# Patient Record
Sex: MALE | Race: OTHER | NOT HISPANIC OR LATINO | URBAN - METROPOLITAN AREA
[De-identification: names, ages, dates, MRNs, and addresses within clinical notes are randomized per-mention and may not be internally consistent; named-entity substitution may affect disease eponyms.]

---

## 2022-12-25 ENCOUNTER — EMERGENCY (EMERGENCY)
Facility: HOSPITAL | Age: 37
LOS: 0 days | Discharge: HOME | End: 2022-12-25
Attending: EMERGENCY MEDICINE | Admitting: EMERGENCY MEDICINE
Payer: MEDICAID

## 2022-12-25 VITALS
SYSTOLIC BLOOD PRESSURE: 139 MMHG | RESPIRATION RATE: 18 BRPM | OXYGEN SATURATION: 100 % | HEART RATE: 88 BPM | TEMPERATURE: 98 F | DIASTOLIC BLOOD PRESSURE: 79 MMHG

## 2022-12-25 DIAGNOSIS — K76.0 FATTY (CHANGE OF) LIVER, NOT ELSEWHERE CLASSIFIED: ICD-10-CM

## 2022-12-25 DIAGNOSIS — N50.89 OTHER SPECIFIED DISORDERS OF THE MALE GENITAL ORGANS: ICD-10-CM

## 2022-12-25 DIAGNOSIS — F17.200 NICOTINE DEPENDENCE, UNSPECIFIED, UNCOMPLICATED: ICD-10-CM

## 2022-12-25 DIAGNOSIS — K82.8 OTHER SPECIFIED DISEASES OF GALLBLADDER: ICD-10-CM

## 2022-12-25 DIAGNOSIS — N45.3 EPIDIDYMO-ORCHITIS: ICD-10-CM

## 2022-12-25 DIAGNOSIS — D72.829 ELEVATED WHITE BLOOD CELL COUNT, UNSPECIFIED: ICD-10-CM

## 2022-12-25 LAB
ALBUMIN SERPL ELPH-MCNC: 4.5 G/DL — SIGNIFICANT CHANGE UP (ref 3.5–5.2)
ALP SERPL-CCNC: 54 U/L — SIGNIFICANT CHANGE UP (ref 30–115)
ALT FLD-CCNC: 19 U/L — SIGNIFICANT CHANGE UP (ref 0–41)
ANION GAP SERPL CALC-SCNC: 16 MMOL/L — HIGH (ref 7–14)
APPEARANCE UR: ABNORMAL
AST SERPL-CCNC: 14 U/L — SIGNIFICANT CHANGE UP (ref 0–41)
BACTERIA # UR AUTO: NEGATIVE — SIGNIFICANT CHANGE UP
BASOPHILS # BLD AUTO: 0.04 K/UL — SIGNIFICANT CHANGE UP (ref 0–0.2)
BASOPHILS NFR BLD AUTO: 0.2 % — SIGNIFICANT CHANGE UP (ref 0–1)
BILIRUB SERPL-MCNC: 1.1 MG/DL — SIGNIFICANT CHANGE UP (ref 0.2–1.2)
BILIRUB UR-MCNC: NEGATIVE — SIGNIFICANT CHANGE UP
BUN SERPL-MCNC: 12 MG/DL — SIGNIFICANT CHANGE UP (ref 10–20)
CALCIUM SERPL-MCNC: 9.1 MG/DL — SIGNIFICANT CHANGE UP (ref 8.4–10.5)
CHLORIDE SERPL-SCNC: 97 MMOL/L — LOW (ref 98–110)
CO2 SERPL-SCNC: 23 MMOL/L — SIGNIFICANT CHANGE UP (ref 17–32)
COLOR SPEC: YELLOW — SIGNIFICANT CHANGE UP
CREAT SERPL-MCNC: 0.6 MG/DL — LOW (ref 0.7–1.5)
DIFF PNL FLD: ABNORMAL
EGFR: 128 ML/MIN/1.73M2 — SIGNIFICANT CHANGE UP
EOSINOPHIL # BLD AUTO: 0 K/UL — SIGNIFICANT CHANGE UP (ref 0–0.7)
EOSINOPHIL NFR BLD AUTO: 0 % — SIGNIFICANT CHANGE UP (ref 0–8)
EPI CELLS # UR: 0 /HPF — SIGNIFICANT CHANGE UP (ref 0–5)
GLUCOSE SERPL-MCNC: 138 MG/DL — HIGH (ref 70–99)
GLUCOSE UR QL: SIGNIFICANT CHANGE UP
HCT VFR BLD CALC: 38.1 % — LOW (ref 42–52)
HGB BLD-MCNC: 13.6 G/DL — LOW (ref 14–18)
HYALINE CASTS # UR AUTO: 0 /LPF — SIGNIFICANT CHANGE UP (ref 0–7)
IMM GRANULOCYTES NFR BLD AUTO: 0.5 % — HIGH (ref 0.1–0.3)
KETONES UR-MCNC: ABNORMAL
LEUKOCYTE ESTERASE UR-ACNC: ABNORMAL
LYMPHOCYTES # BLD AUTO: 0.7 K/UL — LOW (ref 1.2–3.4)
LYMPHOCYTES # BLD AUTO: 3.2 % — LOW (ref 20.5–51.1)
MCHC RBC-ENTMCNC: 32.2 PG — HIGH (ref 27–31)
MCHC RBC-ENTMCNC: 35.7 G/DL — SIGNIFICANT CHANGE UP (ref 32–37)
MCV RBC AUTO: 90.1 FL — SIGNIFICANT CHANGE UP (ref 80–94)
MONOCYTES # BLD AUTO: 1.19 K/UL — HIGH (ref 0.1–0.6)
MONOCYTES NFR BLD AUTO: 5.4 % — SIGNIFICANT CHANGE UP (ref 1.7–9.3)
NEUTROPHILS # BLD AUTO: 19.96 K/UL — HIGH (ref 1.4–6.5)
NEUTROPHILS NFR BLD AUTO: 90.7 % — HIGH (ref 42.2–75.2)
NITRITE UR-MCNC: NEGATIVE — SIGNIFICANT CHANGE UP
NRBC # BLD: 0 /100 WBCS — SIGNIFICANT CHANGE UP (ref 0–0)
PH UR: 6 — SIGNIFICANT CHANGE UP (ref 5–8)
PLATELET # BLD AUTO: 362 K/UL — SIGNIFICANT CHANGE UP (ref 130–400)
POTASSIUM SERPL-MCNC: 3.5 MMOL/L — SIGNIFICANT CHANGE UP (ref 3.5–5)
POTASSIUM SERPL-SCNC: 3.5 MMOL/L — SIGNIFICANT CHANGE UP (ref 3.5–5)
PROT SERPL-MCNC: 6.7 G/DL — SIGNIFICANT CHANGE UP (ref 6–8)
PROT UR-MCNC: ABNORMAL
RBC # BLD: 4.23 M/UL — LOW (ref 4.7–6.1)
RBC # FLD: 12.9 % — SIGNIFICANT CHANGE UP (ref 11.5–14.5)
RBC CASTS # UR COMP ASSIST: 7 /HPF — HIGH (ref 0–4)
SODIUM SERPL-SCNC: 136 MMOL/L — SIGNIFICANT CHANGE UP (ref 135–146)
SP GR SPEC: 1.03 — HIGH (ref 1.01–1.03)
UROBILINOGEN FLD QL: SIGNIFICANT CHANGE UP
WBC # BLD: 22 K/UL — HIGH (ref 4.8–10.8)
WBC # FLD AUTO: 22 K/UL — HIGH (ref 4.8–10.8)
WBC UR QL: 563 /HPF — HIGH (ref 0–5)

## 2022-12-25 PROCEDURE — 99285 EMERGENCY DEPT VISIT HI MDM: CPT

## 2022-12-25 PROCEDURE — 76870 US EXAM SCROTUM: CPT | Mod: 26

## 2022-12-25 PROCEDURE — 74177 CT ABD & PELVIS W/CONTRAST: CPT | Mod: 26,MA

## 2022-12-25 RX ORDER — KETOROLAC TROMETHAMINE 30 MG/ML
60 SYRINGE (ML) INJECTION ONCE
Refills: 0 | Status: DISCONTINUED | OUTPATIENT
Start: 2022-12-25 | End: 2022-12-26

## 2022-12-25 RX ORDER — SODIUM CHLORIDE 9 MG/ML
1000 INJECTION, SOLUTION INTRAVENOUS ONCE
Refills: 0 | Status: COMPLETED | OUTPATIENT
Start: 2022-12-25 | End: 2022-12-25

## 2022-12-25 RX ORDER — ONDANSETRON 8 MG/1
4 TABLET, FILM COATED ORAL ONCE
Refills: 0 | Status: COMPLETED | OUTPATIENT
Start: 2022-12-25 | End: 2022-12-25

## 2022-12-25 RX ORDER — CEFTRIAXONE 500 MG/1
500 INJECTION, POWDER, FOR SOLUTION INTRAMUSCULAR; INTRAVENOUS ONCE
Refills: 0 | Status: COMPLETED | OUTPATIENT
Start: 2022-12-25 | End: 2022-12-25

## 2022-12-25 RX ORDER — MORPHINE SULFATE 50 MG/1
4 CAPSULE, EXTENDED RELEASE ORAL ONCE
Refills: 0 | Status: DISCONTINUED | OUTPATIENT
Start: 2022-12-25 | End: 2022-12-25

## 2022-12-25 RX ADMIN — MORPHINE SULFATE 4 MILLIGRAM(S): 50 CAPSULE, EXTENDED RELEASE ORAL at 19:30

## 2022-12-25 RX ADMIN — SODIUM CHLORIDE 1000 MILLILITER(S): 9 INJECTION, SOLUTION INTRAVENOUS at 19:14

## 2022-12-25 RX ADMIN — CEFTRIAXONE 500 MILLIGRAM(S): 500 INJECTION, POWDER, FOR SOLUTION INTRAMUSCULAR; INTRAVENOUS at 20:00

## 2022-12-25 RX ADMIN — MORPHINE SULFATE 4 MILLIGRAM(S): 50 CAPSULE, EXTENDED RELEASE ORAL at 19:14

## 2022-12-25 RX ADMIN — ONDANSETRON 4 MILLIGRAM(S): 8 TABLET, FILM COATED ORAL at 19:15

## 2022-12-25 NOTE — ED PROVIDER NOTE - ATTENDING APP SHARED VISIT CONTRIBUTION OF CARE
37 year old male, no pmhx presenting with b/l testicular pain and swelling that has been worsening x 1 week. Pain is described as burning, non-radiating, no palliative or provocative factors, moderate severity. Denies having this before. Otherwise denies fevers, penile discharge, abd pain, N/V/D, blood in stool, urinary symptoms or any other complaints. Denies sexual activity.    Vital Signs: I have reviewed the initial vital signs.  Constitutional: NAD, well-nourished, appears stated age, no acute distress.  HEENT: Airway patent, moist MM, no erythema/swelling/deformity of oral structures. EOMI, PERRLA.  CV: regular rate, regular rhythm, well-perfused extremities, 2+ b/l DP and radial pulses equal.  Lungs: BCTA, no increased WOB.  ABD: (+) lower abdominal tenderness, no guarding or rebound, no pulsatile mass, no hernias.   : (+) tenderness to b/l testicles with mild swelling but b/l cremasteric reflexes intact, normal testicular lie, no penile lesions  MSK: Neck supple, nontender, nl ROM, no stepoff. Chest nontender. Back nontender in TLS spine or to b/l bony structures or flanks. Ext nontender, nl rom, no deformity.   INTEG: Skin warm, dry, no rash.  NEURO: A&Ox3, normal strength, nl sensation throughout, normal speech.   PSYCH: Calm, cooperative, normal affect and interaction.    Will obtain labs, testicular US, consider CT abd/pelvis for tenderness, symptomatic control PRN, re-eval.

## 2022-12-25 NOTE — ED PROVIDER NOTE - CARE PROVIDER_API CALL
Ranjith Mcconnell)  Urology  [address] [place] [address], [address]  [place] [address]  Phone: [phone]  Fax: [phone]  Follow Up Time:

## 2022-12-25 NOTE — ED PROVIDER NOTE - OBJECTIVE STATEMENT
37 M no hx presents to ED for testicular pain and swelling. sts that he has had the pain for about a week but it has now become constant with radiation to his lower abdomen. denies sexual activity with multiple partners, N,V,fver,Chills, penile discharge

## 2022-12-25 NOTE — ED PROVIDER NOTE - NS ED ATTENDING STATEMENT MOD
This was a shared visit with the MARIA ALEJANDRA. I reviewed and verified the documentation and independently performed the documented:

## 2022-12-25 NOTE — ED PROVIDER NOTE - NS ED ROS FT
Constitutional: (-) fever, (-) chills  Eyes: (-) visual changes  ENT: (-) nasal congestions  Cardiovascular: (-) chest pain, (-) syncope  Respiratory: (-) cough, (-) shortness of breath, (-) dyspnea,   Gastrointestinal: (-) vomiting, (-) diarrhea, (-)nausea,  Musculoskeletal: (-) neck pain, (-) back pain, (-) joint pain,  Integumentary: (-) rash, (-) edema, (-) bruises  Neurological: (-) headache, (-) loc, (-) dizziness, (-) tingling, (-)numbness,  Psychiatric: (-) hallucinations, (-)nervousness, (-)depression, (-)SI/HI  Peripheral Vascular: (-) leg swelling  :  (-)dysuria,  (+) hematuria (+) scrotal pain   Allergic/Immunologic: (-) pruritus

## 2022-12-25 NOTE — ED PROVIDER NOTE - PHYSICAL EXAMINATION
No
VITAL SIGNS: I have reviewed nursing notes and confirm.  CONSTITUTIONAL:  in no acute distress.  SKIN: Skin exam is warm and dry, no acute rash.  HEAD: Normocephalic; atraumatic.  EYES:  EOM intact; conjunctiva and sclera clear.  ENT: No nasal discharge; airway clear.   NECK: Supple; non tender.  CARD: S1, S2 normal; no murmurs, gallops, or rubs. Regular rate and rhythm.  RESP: No wheezes, rales or rhonchi. Speaking in full sentences.   ABD: Normal bowel sounds; soft; non-distended; tender to lower abdomen; No rebound or guarding. No CVA tenderness.  : right side scrotum erythematous and swollen, ttp scrotum, + phren sign, + cremasteric reflex   EXT: Normal ROM. No clubbing, cyanosis or edema.  NEURO: Alert, oriented. Grossly unremarkable. No focal deficits.

## 2022-12-25 NOTE — ED PROVIDER NOTE - NSFOLLOWUPINSTRUCTIONS_ED_ALL_ED_FT
Please follow up with urology in the next 1-3 days     *****Our Emergency Department Referral Coordinators will be reaching out to you in the next 24-48 hours from 9:00am to 5:00pm with a follow up appointment. Please expect a phone call from the hospital in that time frame. If you do not receive a call or if you have any questions or concerns, you can reach them at [phone] or [phone].     WHAT YOU NEED TO KNOW:    What is epididymo-orchitis? Epididymo-orchitis is inflammation of your epididymis and testicle. The epididymis is a coiled tube inside your scrotum. It stores and carries sperm from your testicles to your penis. Epididymo-orchitis usually affects the epididymis and testicle on one side, but it may affect both sides.     What causes epididymo-orchitis?   •A urinary tract infection (UTI) or mumps virus infection that spreads to the epididymis   •Trauma or injury of the testes   •Urine that flows backward from your urethra to the epididymis  •Sexually transmitted infections (STIs) such as gonorrhea or Chlamydia  •Use of heart medicine called amiodarone    What are the signs and symptoms of epididymo-orchitis?   •Pain or tenderness in your scrotum, abdomen, or groin  •Redness or swelling of your scrotum  •Pain or burning during urination, or frequent urination  •Discharge from your penis or blood in your urine or semen  •Fever    How is epididymo-orchitis diagnosed? Your healthcare provider may examine your penis, prostate, and scrotum. He may ask about your symptoms and any health conditions you have. You may need any of the following tests:   •Blood and urine tests may be done to check for infection. If you have discharge, a small amount of this fluid will be tested for bacteria.   •An ultrasound uses sound waves to show pictures of your testicles on a monitor. An ultrasound may be used to check blood flow to your testicles.   •A nuclear scan checks the blood flow in your testicles. A small amount of radioactive material may be injected into your blood. The radioactive material helps your blood vessels show up better.    How is epididymo-orchitis treated? Treatment depends on the cause of your epididymo-orchitis and may include any of the following:   •Antibiotics help treat a bacterial infection.   •NSAIDs, such as ibuprofen, help decrease swelling, pain, and fever. This medicine is available with or without a doctor's order. NSAIDs can cause stomach bleeding or kidney problems in certain people. If you take blood thinner medicine, always ask if NSAIDs are safe for you. Always read the medicine label and follow directions. Do not give these medicines to children under 6 months of age without direction from your child's healthcare provider.  •Acetaminophen decreases pain and fever. It is available without a doctor's order. Ask how much to take and how often to take it. Follow directions. Acetaminophen can cause liver damage if not taken correctly.  •Prescription pain medicine may be given. Ask how to take this medicine safely.  •Surgery may be needed if your condition gets worse. Surgery to drain an abscess (collection of pus) may be needed. Surgery to remove part or all of your epididymis or testicle may also be done.     How can I manage epididymo-orchitis?   •Apply ice on your testicles for 15 to 20 minutes every hour or as directed. Use an ice pack, or put crushed ice in a plastic bag. Cover it with a towel. Ice helps prevent tissue damage and decreases swelling and pain.  •Rest in bed as directed. Elevate your scrotum when you sit or lie down to help reduce swelling and pain. You may be asked to do this by placing a rolled-up towel under your scrotum.  •Scrotal support may be recommended. An athletic supporter provides scrotal support and may make you more comfortable when you stand. Ask your healthcare provider how to use an athletic supporter.   •Do not lift heavy objects. You can make swelling worse if you lift heavy objects or strain.     When should I seek immediate care?   •You have severe pain in your testicles.  •Your symptoms become worse even after you start treatment with medicine.    When should I contact my healthcare provider?   •Your symptoms do not get better within 3 days of treatment or come back after treatment.  •You have a hot, red, tender area on your testicles.  •You have questions or concerns about your condition or care.    CARE AGREEMENT:    You have the right to help plan your care. Learn about your health condition and how it may be treated. Discuss treatment options with your healthcare providers to decide what care you want to receive. You always have the right to refuse treatment.

## 2022-12-25 NOTE — ED PROVIDER NOTE - PATIENT PORTAL LINK FT
You can access the FollowMyHealth Patient Portal offered by [facility] by registering at the following website: [email or web address]. By joining [employer]’s FollowMyHealth portal, you will also be able to view your health information using other applications (apps) compatible with our system.

## 2022-12-25 NOTE — ED PROVIDER NOTE - IV ALTEPLASE EXCL REL HIDDEN
Spoke to mom. Notified that the doctor still needs to review results however this RN has reviewed them and they appear stable. Notified that if Dr. Rodriguez has any concerns, we will call her back   show

## 2022-12-25 NOTE — ED PROVIDER NOTE - CLINICAL SUMMARY MEDICAL DECISION MAKING FREE TEXT BOX
Patient presented with testicular pain and swelling x 1 week. Otherwise afebrile, HD stable, abd non-tender. No CVA tenderness. Labs obtained and showed (+) leukocytosis to 22k but no other abnormalities. UA showed (+) blood but no evidence of infection. CT abd/pelvis negative for emergent pathologies. Testicular US showed (+) signs of epididymoorchitis which is likely etiology of patient's pain. No abscess. Patient felt better after tx in ED. Ambulatory, tolerates PO. Given the above, will discharge home with PO abx and outpatient follow up. Patient agreeable with plan. Agrees to return to ED for any new or worsening symptoms.

## 2022-12-26 ENCOUNTER — INPATIENT (INPATIENT)
Facility: HOSPITAL | Age: 37
LOS: 2 days | Discharge: HOME | End: 2022-12-29
Attending: STUDENT IN AN ORGANIZED HEALTH CARE EDUCATION/TRAINING PROGRAM | Admitting: STUDENT IN AN ORGANIZED HEALTH CARE EDUCATION/TRAINING PROGRAM
Payer: MEDICAID

## 2022-12-26 VITALS
DIASTOLIC BLOOD PRESSURE: 70 MMHG | HEART RATE: 94 BPM | SYSTOLIC BLOOD PRESSURE: 130 MMHG | RESPIRATION RATE: 17 BRPM | WEIGHT: 149.91 LBS | TEMPERATURE: 100 F | OXYGEN SATURATION: 98 %

## 2022-12-26 LAB
HCV AB S/CO SERPL IA: 0.05 COI — SIGNIFICANT CHANGE UP
HCV AB SERPL-IMP: SIGNIFICANT CHANGE UP
SARS-COV-2 RNA SPEC QL NAA+PROBE: SIGNIFICANT CHANGE UP

## 2022-12-26 PROCEDURE — 99221 1ST HOSP IP/OBS SF/LOW 40: CPT

## 2022-12-26 PROCEDURE — 99285 EMERGENCY DEPT VISIT HI MDM: CPT

## 2022-12-26 RX ORDER — CEFTRIAXONE 500 MG/1
2000 INJECTION, POWDER, FOR SOLUTION INTRAMUSCULAR; INTRAVENOUS EVERY 24 HOURS
Refills: 0 | Status: DISCONTINUED | OUTPATIENT
Start: 2022-12-26 | End: 2022-12-26

## 2022-12-26 RX ORDER — MORPHINE SULFATE 50 MG/1
2 CAPSULE, EXTENDED RELEASE ORAL ONCE
Refills: 0 | Status: DISCONTINUED | OUTPATIENT
Start: 2022-12-26 | End: 2022-12-26

## 2022-12-26 RX ORDER — ONDANSETRON 8 MG/1
4 TABLET, FILM COATED ORAL EVERY 8 HOURS
Refills: 0 | Status: DISCONTINUED | OUTPATIENT
Start: 2022-12-26 | End: 2022-12-29

## 2022-12-26 RX ORDER — CEFTRIAXONE 500 MG/1
1000 INJECTION, POWDER, FOR SOLUTION INTRAMUSCULAR; INTRAVENOUS EVERY 24 HOURS
Refills: 0 | Status: DISCONTINUED | OUTPATIENT
Start: 2022-12-26 | End: 2022-12-27

## 2022-12-26 RX ORDER — MORPHINE SULFATE 50 MG/1
4 CAPSULE, EXTENDED RELEASE ORAL ONCE
Refills: 0 | Status: DISCONTINUED | OUTPATIENT
Start: 2022-12-26 | End: 2022-12-26

## 2022-12-26 RX ORDER — INFLUENZA VIRUS VACCINE 15; 15; 15; 15 UG/.5ML; UG/.5ML; UG/.5ML; UG/.5ML
0.5 SUSPENSION INTRAMUSCULAR ONCE
Refills: 0 | Status: DISCONTINUED | OUTPATIENT
Start: 2022-12-26 | End: 2022-12-29

## 2022-12-26 RX ORDER — CEFTRIAXONE 500 MG/1
1000 INJECTION, POWDER, FOR SOLUTION INTRAMUSCULAR; INTRAVENOUS ONCE
Refills: 0 | Status: DISCONTINUED | OUTPATIENT
Start: 2022-12-26 | End: 2022-12-26

## 2022-12-26 RX ORDER — KETOROLAC TROMETHAMINE 30 MG/ML
15 SYRINGE (ML) INJECTION EVERY 12 HOURS
Refills: 0 | Status: DISCONTINUED | OUTPATIENT
Start: 2022-12-26 | End: 2022-12-28

## 2022-12-26 RX ORDER — ACETAMINOPHEN 500 MG
650 TABLET ORAL EVERY 6 HOURS
Refills: 0 | Status: DISCONTINUED | OUTPATIENT
Start: 2022-12-26 | End: 2022-12-29

## 2022-12-26 RX ORDER — SODIUM CHLORIDE 9 MG/ML
1000 INJECTION, SOLUTION INTRAVENOUS ONCE
Refills: 0 | Status: COMPLETED | OUTPATIENT
Start: 2022-12-26 | End: 2022-12-26

## 2022-12-26 RX ORDER — TRAMADOL HYDROCHLORIDE 50 MG/1
25 TABLET ORAL EVERY 8 HOURS
Refills: 0 | Status: DISCONTINUED | OUTPATIENT
Start: 2022-12-26 | End: 2022-12-28

## 2022-12-26 RX ADMIN — MORPHINE SULFATE 2 MILLIGRAM(S): 50 CAPSULE, EXTENDED RELEASE ORAL at 09:36

## 2022-12-26 RX ADMIN — SODIUM CHLORIDE 1000 MILLILITER(S): 9 INJECTION, SOLUTION INTRAVENOUS at 02:00

## 2022-12-26 RX ADMIN — TRAMADOL HYDROCHLORIDE 25 MILLIGRAM(S): 50 TABLET ORAL at 21:17

## 2022-12-26 RX ADMIN — MORPHINE SULFATE 2 MILLIGRAM(S): 50 CAPSULE, EXTENDED RELEASE ORAL at 22:18

## 2022-12-26 RX ADMIN — Medication 100 MILLIGRAM(S): at 17:23

## 2022-12-26 RX ADMIN — MORPHINE SULFATE 2 MILLIGRAM(S): 50 CAPSULE, EXTENDED RELEASE ORAL at 22:48

## 2022-12-26 RX ADMIN — Medication 100 MILLIGRAM(S): at 11:44

## 2022-12-26 RX ADMIN — MORPHINE SULFATE 4 MILLIGRAM(S): 50 CAPSULE, EXTENDED RELEASE ORAL at 03:00

## 2022-12-26 RX ADMIN — Medication 15 MILLIGRAM(S): at 14:26

## 2022-12-26 RX ADMIN — CEFTRIAXONE 100 MILLIGRAM(S): 500 INJECTION, POWDER, FOR SOLUTION INTRAMUSCULAR; INTRAVENOUS at 11:46

## 2022-12-26 RX ADMIN — TRAMADOL HYDROCHLORIDE 25 MILLIGRAM(S): 50 TABLET ORAL at 21:47

## 2022-12-26 NOTE — H&P ADULT - HISTORY OF PRESENT ILLNESS
37 y m, no pmh, pw testicular pain. Pt was just evaluated in the ed hours ago and found to have epididymitis. Pt given ceftriaxone and told to follow up with urologist. However, pt endorses testicular pain is still 10/10, constant, radiating to the lower belly, no relieving factors, requesting admission.    ED vitals:   /70, HR 94, RR 17, Temp 99.9, O2 99% on RA    Sig labs:   WBC 22K otherwise unremarkable    UA: +ve for LE    Ct abd pelvis with IV cont:  No hydronephrosis or nephrolithiasis. Evaluation for   intrarenal stones limited by the presence of intravenous contrast.  Large liver with mild fatty infiltration. Distended gallbladder with no   radiopaque gallstones identified.    US testicles:   Right testicular and epididymis hyperemia consistent with   epididymoorchitis in appropriate clinical setting. No drainable fluid   collection.    Pt was given ceftriaxone and levaquin in the ED, Pt was supposed to be sent home from the ED but said pain was unbearable so he was admitted     37 y Male with PMHx of percocet abuse presented to the ED for testicular pain. Pt was just evaluated in the ed hours before current presentation and found to have epididymitis. Pt given ceftriaxone and told to follow up with urologist. However, pt returned back endorses testicular pain is still 10/10, constant, radiating to the lower belly, no relieving factors, requesting admission.    ED vitals:   /70, HR 94, RR 17, Temp 99.9, O2 99% on RA    Sig labs:   WBC 22K otherwise unremarkable    UA: +ve for LE    Ct abd pelvis with IV cont:  No hydronephrosis or nephrolithiasis. Evaluation for   intrarenal stones limited by the presence of intravenous contrast.  Large liver with mild fatty infiltration. Distended gallbladder with no   radiopaque gallstones identified.    US testicles:   Right testicular and epididymis hyperemia consistent with   epididymoorchitis in appropriate clinical setting. No drainable fluid   collection.    Pt was given ceftriaxone and levaquin in the ED, Pt was supposed to be sent home from the ED but said pain was unbearable so he was admitted     37 y Male with PMHx of percocet abuse presented to the ED for testicular pain. Pt was just evaluated in the ed hours before current presentation and found to have epididymitis. Pain started Debra georgette with testicular swelling. Pt given ceftriaxone and told to follow up with urologist. However, pt returned back endorses testicular pain is still 10/10, constant, radiating to the lower belly.     Pt has had increased frequency of urination since 2020 and knew he had some kidney stones but never saw a doctor. Pt is monogamous with his significant other for years. Pt Denies burning or new lesions but endorses hematuria.    ED vitals:   /70, HR 94, RR 17, Temp 99.9, O2 99% on RA    Sig labs:   WBC 22K otherwise unremarkable    UA: +ve for LE    Ct abd pelvis with IV cont:  No hydronephrosis or nephrolithiasis. Evaluation for   intrarenal stones limited by the presence of intravenous contrast.  Large liver with mild fatty infiltration. Distended gallbladder with no   radiopaque gallstones identified.    US testicles:   Right testicular and epididymis hyperemia consistent with   epididymoorchitis in appropriate clinical setting. No drainable fluid   collection.    Pt was given ceftriaxone and levaquin in the ED, Pt was supposed to be sent home from the ED but said pain was unbearable so he was admitted

## 2022-12-26 NOTE — ED PROVIDER NOTE - OBJECTIVE STATEMENT
37 y m, no pmh, pw testicular pain. Pt was just evaluated in the ed hours ago and found to have epididymitis. Pt given ceftriaxone and told to follow up with urologist. However, pt endorses testicular pain is still 10/10, constant, radiating to the lower belly, no relieving factors, requesting admission.

## 2022-12-26 NOTE — PATIENT PROFILE ADULT - FALL HARM RISK - HARM RISK INTERVENTIONS

## 2022-12-26 NOTE — ED PROVIDER NOTE - CLINICAL SUMMARY MEDICAL DECISION MAKING FREE TEXT BOX
37 male here evaluation of 2 weeks of right testicle pain which is worsening.  Patient has no fever chills and is only sexually active with 1 partner.  Patient was seen in the ED few hours ago.  Patient was found to have a white count of 20,000 as well as right testicular epididymal orchitis.  Patient returns as the pain is improved.  Here patient has a low-grade fever discussed with neurology felt there is no acute surgical intervention but felt patient would benefit from IV antibiotics, infectious disease evaluation and further eval.

## 2022-12-26 NOTE — H&P ADULT - NSHPLABSRESULTS_GEN_ALL_CORE
LABS:                          13.6   22.00 )-----------( 362      ( 25 Dec 2022 18:20 )             38.1     12-25    136  |  97<L>  |  12  ----------------------------<  138<H>  3.5   |  23  |  0.6<L>    Ca    9.1      25 Dec 2022 18:20    TPro  6.7  /  Alb  4.5  /  TBili  1.1  /  DBili  x   /  AST  14  /  ALT  19  /  AlkPhos  54  12-25

## 2022-12-26 NOTE — ED ADULT TRIAGE NOTE - CHIEF COMPLAINT QUOTE
right sided testicular pain  was seen in ED yesterday, prescribed antibiotics but pain became worse tonight  also states blood in urine

## 2022-12-26 NOTE — H&P ADULT - NSHPPHYSICALEXAM_GEN_ALL_CORE
GENERAL: NAD, lying in bed comfortably  HEAD:  Atraumatic, Normocephalic  EYES: EOMI, PERRLA, conjunctiva and sclera clear  ENT: Moist mucous membranes  NECK: Supple, No JVD  CHEST/LUNG: Clear to auscultation bilaterally; No rales, rhonchi, wheezing, or rubs. Unlabored respirations  HEART: Regular rate and rhythm; No murmurs, rubs, or gallops  ABDOMEN: BSx4; Soft, nontender, nondistended  EXTREMITIES:  2+ Peripheral Pulses, brisk capillary refill. No clubbing, cyanosis, or edema  NERVOUS SYSTEM:  A&Ox3, no focal deficits   SKIN: No rashes or lesions GENERAL: NAD, lying in bed comfortably  HEAD:  Atraumatic, Normocephalic  EYES: EOMI, PERRLA, conjunctiva and sclera clear  ENT: Moist mucous membranes  NECK: Supple, No JVD  CHEST/LUNG: Clear to auscultation bilaterally; No rales, rhonchi, wheezing, or rubs. Unlabored respirations  HEART: Regular rate and rhythm; No murmurs, rubs, or gallops  ABDOMEN: BSx4; Soft, nontender, nondistended  EXTREMITIES:  2+ Peripheral Pulses, brisk capillary refill. No clubbing, cyanosis, or edema  NERVOUS SYSTEM:  A&Ox3, no focal deficits   : testicular swelling, no erythema noted, very tender on palpation

## 2022-12-26 NOTE — CONSULT NOTE ADULT - NS ATTEND AMEND GEN_ALL_CORE FT
--right testis and epididymus tender , enlarged -- consistent with US findings .  -- continue iv ABT   -no further intervention

## 2022-12-26 NOTE — CONSULT NOTE ADULT - ASSESSMENT
Pt is a 38yo Male with no PMH comes to ED c/o righ testicular pain x 2 weeks but worse since 12/24/22. Urology called to evaluate Pt. for Testicular US findings of right epididymoorchitis.    Testicular US 12/25/22:  Right testicular and epididymis hyperemia consistent with epididymoorchitis in appropriate clinical setting. No drainable fluid collection.    CT A/P   No hydronephrosis or nephrolithiasis. Evaluation for intrarenal stones limited by the presence of intravenous contrast.       Plan:  No acute surgical urologic intervention needed  Agree with repeating testicular US  STD panel   F/U Urine cx   Urine cytology  Cont IV Abx as per ID recommendations   NSAID x pain ( Toradol, Tylenol)  Keep scrotum elevated at all the time.   Urology will F/U   Will discuss with  attending.

## 2022-12-26 NOTE — H&P ADULT - NSHPSOCIALHISTORY_GEN_ALL_CORE
- monogamous with significant other  - takes percocet recreationally - monogamous with significant other  - takes percocet and xanax recreationally
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
## 2022-12-26 NOTE — H&P ADULT - ASSESSMENT
37 y m, no pmh, pw testicular pain. Admitted pain control and epididymoorchitis.    #epididymoorchitis  #Sepsis on admission (elevated white count, HR >90)  - Ct abd pelvis with IV cont:  No hydronephrosis or nephrolithiasis. Evaluation for   intrarenal stones limited by the presence of intravenous contrast.  Large liver with mild fatty infiltration. Distended gallbladder with no   radiopaque gallstones identified.  - Right testicular and epididymis hyperemia consistent with   epididymoorchitis in appropriate clinical setting. No drainable fluid   collection.  - continue with ceftriaxone and doxy   - pain control with toradol  - Uro following: No acute surgical urologic intervention needed, IV abx, urine cx, std panel, urine cytology    Full code  Regular diet  Ambulate as tolerated   37 y Male with PMHx of percocet abuse presented to the ED for testicular pain.. Admitted pain control and epididymoorchitis.    #epididymoorchitis  #Sepsis on admission (elevated white count, HR >90)  - Ct abd pelvis with IV cont:  No hydronephrosis or nephrolithiasis. Evaluation for   intrarenal stones limited by the presence of intravenous contrast.  Large liver with mild fatty infiltration. Distended gallbladder with no   radiopaque gallstones identified.  - Right testicular and epididymis hyperemia consistent with   epididymoorchitis in appropriate clinical setting. No drainable fluid   collection.  - continue with ceftriaxone and doxy   - pain control with toradol  - Uro following: No acute surgical urologic intervention needed, IV abx, urine cx, std panel, urine cytology    Full code  Regular diet  Ambulate as tolerated   37 y Male with PMHx of percocet abuse presented to the ED for testicular pain.. Admitted pain control and epididymoorchitis.    #epididymoorchitis  #Sepsis on admission (elevated white count, HR >90)  - Ct abd pelvis with IV cont:  No hydronephrosis or nephrolithiasis. Evaluation for   intrarenal stones limited by the presence of intravenous contrast.  Large liver with mild fatty infiltration. Distended gallbladder with no   radiopaque gallstones identified.  - Right testicular and epididymis hyperemia consistent with   epididymoorchitis in appropriate clinical setting. No drainable fluid   collection.  - continue with ceftriaxone and doxy   - pain control with toradol  - Uro following: No acute surgical urologic intervention needed, IV abx, urine cx, std panel, urine cytology  - fup STD panel  - fup UCx    Full code  Regular diet  Ambulate as tolerated

## 2022-12-26 NOTE — ED PROVIDER NOTE - PHYSICAL EXAMINATION
CONSTITUTIONAL: NAD  SKIN: Warm dry  HEAD: NCAT  EYES: NL inspection  ENT: MMM  NECK: Supple; non tender.  CARD: RRR  RESP: CTAB  ABD: S/NT no R/G  : Right testicle swollen, erythematous, ttp, no cremasteric reflex.   BACK: nontender  EXT: no pedal edema  NEURO: Grossly unremarkable  PSYCH: Cooperative, appropriate.

## 2022-12-26 NOTE — H&P ADULT - NSHPREVIEWOFSYSTEMS_GEN_ALL_CORE
CONSTITUTIONAL: No weakness, fevers or chills  EYES/ENT: No visual changes;  No vertigo or throat pain   RESPIRATORY: No cough, wheezing, hemoptysis; No shortness of breath  CARDIOVASCULAR: No chest pain or palpitations  GASTROINTESTINAL: No abdominal or epigastric pain. No nausea, vomiting, or hematemesis; No diarrhea or constipation. No melena or hematochezia.  GENITOURINARY: No dysuria, frequency or hematuria  NEUROLOGICAL: No numbness or weakness  SKIN: No itching, rashes CONSTITUTIONAL: No weakness, fevers or chills  EYES/ENT: No visual changes;  No vertigo or throat pain   RESPIRATORY: No cough, wheezing, hemoptysis; No shortness of breath  CARDIOVASCULAR: No chest pain or palpitations  GASTROINTESTINAL: Lower abdominal pain  GENITOURINARY:  +ve frequency or hematuria, No dysuria,  NEUROLOGICAL: No numbness or weakness  SKIN: No itching, rashes CONSTITUTIONAL: No weakness, fevers or chills  EYES/ENT: No visual changes;  No vertigo or throat pain   RESPIRATORY: No cough, wheezing, hemoptysis; No shortness of breath  CARDIOVASCULAR: No chest pain or palpitations  GASTROINTESTINAL: Lower abdominal pain  GENITOURINARY:  +ve frequency or hematuria, No dysuria,

## 2022-12-27 LAB
ALBUMIN SERPL ELPH-MCNC: 4 G/DL — SIGNIFICANT CHANGE UP (ref 3.5–5.2)
ALP SERPL-CCNC: 82 U/L — SIGNIFICANT CHANGE UP (ref 30–115)
ALT FLD-CCNC: 21 U/L — SIGNIFICANT CHANGE UP (ref 0–41)
ANION GAP SERPL CALC-SCNC: 10 MMOL/L — SIGNIFICANT CHANGE UP (ref 7–14)
AST SERPL-CCNC: 18 U/L — SIGNIFICANT CHANGE UP (ref 0–41)
BASOPHILS # BLD AUTO: 0.05 K/UL — SIGNIFICANT CHANGE UP (ref 0–0.2)
BASOPHILS NFR BLD AUTO: 0.2 % — SIGNIFICANT CHANGE UP (ref 0–1)
BILIRUB SERPL-MCNC: 0.9 MG/DL — SIGNIFICANT CHANGE UP (ref 0.2–1.2)
BUN SERPL-MCNC: 12 MG/DL — SIGNIFICANT CHANGE UP (ref 10–20)
C TRACH RRNA SPEC QL NAA+PROBE: SIGNIFICANT CHANGE UP
CALCIUM SERPL-MCNC: 9.2 MG/DL — SIGNIFICANT CHANGE UP (ref 8.4–10.4)
CHLORIDE SERPL-SCNC: 99 MMOL/L — SIGNIFICANT CHANGE UP (ref 98–110)
CO2 SERPL-SCNC: 29 MMOL/L — SIGNIFICANT CHANGE UP (ref 17–32)
CREAT SERPL-MCNC: 0.8 MG/DL — SIGNIFICANT CHANGE UP (ref 0.7–1.5)
CULTURE RESULTS: NO GROWTH — SIGNIFICANT CHANGE UP
EGFR: 117 ML/MIN/1.73M2 — SIGNIFICANT CHANGE UP
EOSINOPHIL # BLD AUTO: 0.01 K/UL — SIGNIFICANT CHANGE UP (ref 0–0.7)
EOSINOPHIL NFR BLD AUTO: 0 % — SIGNIFICANT CHANGE UP (ref 0–8)
GLUCOSE SERPL-MCNC: 83 MG/DL — SIGNIFICANT CHANGE UP (ref 70–99)
HCT VFR BLD CALC: 34.8 % — LOW (ref 42–52)
HGB BLD-MCNC: 12.2 G/DL — LOW (ref 14–18)
HIV 1+2 AB+HIV1 P24 AG SERPL QL IA: SIGNIFICANT CHANGE UP
IMM GRANULOCYTES NFR BLD AUTO: 1.8 % — HIGH (ref 0.1–0.3)
LYMPHOCYTES # BLD AUTO: 1.78 K/UL — SIGNIFICANT CHANGE UP (ref 1.2–3.4)
LYMPHOCYTES # BLD AUTO: 7.3 % — LOW (ref 20.5–51.1)
MAGNESIUM SERPL-MCNC: 1.9 MG/DL — SIGNIFICANT CHANGE UP (ref 1.8–2.4)
MCHC RBC-ENTMCNC: 32.3 PG — HIGH (ref 27–31)
MCHC RBC-ENTMCNC: 35.1 G/DL — SIGNIFICANT CHANGE UP (ref 32–37)
MCV RBC AUTO: 92.1 FL — SIGNIFICANT CHANGE UP (ref 80–94)
MONOCYTES # BLD AUTO: 1.65 K/UL — HIGH (ref 0.1–0.6)
MONOCYTES NFR BLD AUTO: 6.8 % — SIGNIFICANT CHANGE UP (ref 1.7–9.3)
N GONORRHOEA RRNA SPEC QL NAA+PROBE: SIGNIFICANT CHANGE UP
NEUTROPHILS # BLD AUTO: 20.49 K/UL — HIGH (ref 1.4–6.5)
NEUTROPHILS NFR BLD AUTO: 83.9 % — HIGH (ref 42.2–75.2)
NRBC # BLD: 0 /100 WBCS — SIGNIFICANT CHANGE UP (ref 0–0)
PLATELET # BLD AUTO: 277 K/UL — SIGNIFICANT CHANGE UP (ref 130–400)
POTASSIUM SERPL-MCNC: 4.1 MMOL/L — SIGNIFICANT CHANGE UP (ref 3.5–5)
POTASSIUM SERPL-SCNC: 4.1 MMOL/L — SIGNIFICANT CHANGE UP (ref 3.5–5)
PROT SERPL-MCNC: 6.3 G/DL — SIGNIFICANT CHANGE UP (ref 6–8)
RBC # BLD: 3.78 M/UL — LOW (ref 4.7–6.1)
RBC # FLD: 13.2 % — SIGNIFICANT CHANGE UP (ref 11.5–14.5)
SODIUM SERPL-SCNC: 138 MMOL/L — SIGNIFICANT CHANGE UP (ref 135–146)
SPECIMEN SOURCE: SIGNIFICANT CHANGE UP
SPECIMEN SOURCE: SIGNIFICANT CHANGE UP
T PALLIDUM AB TITR SER: NEGATIVE — SIGNIFICANT CHANGE UP
WBC # BLD: 24.41 K/UL — HIGH (ref 4.8–10.8)
WBC # FLD AUTO: 24.41 K/UL — HIGH (ref 4.8–10.8)

## 2022-12-27 PROCEDURE — 99222 1ST HOSP IP/OBS MODERATE 55: CPT

## 2022-12-27 PROCEDURE — 76870 US EXAM SCROTUM: CPT | Mod: 26

## 2022-12-27 RX ORDER — CEFTRIAXONE 500 MG/1
2000 INJECTION, POWDER, FOR SOLUTION INTRAMUSCULAR; INTRAVENOUS EVERY 24 HOURS
Refills: 0 | Status: DISCONTINUED | OUTPATIENT
Start: 2022-12-28 | End: 2022-12-29

## 2022-12-27 RX ORDER — CEFTRIAXONE 500 MG/1
INJECTION, POWDER, FOR SOLUTION INTRAMUSCULAR; INTRAVENOUS
Refills: 0 | Status: COMPLETED | OUTPATIENT
Start: 2022-12-27 | End: 2022-12-28

## 2022-12-27 RX ORDER — SODIUM CHLORIDE 9 MG/ML
1000 INJECTION INTRAMUSCULAR; INTRAVENOUS; SUBCUTANEOUS
Refills: 0 | Status: DISCONTINUED | OUTPATIENT
Start: 2022-12-27 | End: 2022-12-29

## 2022-12-27 RX ORDER — CEFTRIAXONE 500 MG/1
2000 INJECTION, POWDER, FOR SOLUTION INTRAMUSCULAR; INTRAVENOUS ONCE
Refills: 0 | Status: COMPLETED | OUTPATIENT
Start: 2022-12-27 | End: 2022-12-27

## 2022-12-27 RX ORDER — CEFTRIAXONE 500 MG/1
2 INJECTION, POWDER, FOR SOLUTION INTRAMUSCULAR; INTRAVENOUS EVERY 12 HOURS
Refills: 0 | Status: DISCONTINUED | OUTPATIENT
Start: 2022-12-27 | End: 2022-12-27

## 2022-12-27 RX ORDER — GENTAMICIN SULFATE 40 MG/ML
350 VIAL (ML) INJECTION EVERY 24 HOURS
Refills: 0 | Status: COMPLETED | OUTPATIENT
Start: 2022-12-27 | End: 2022-12-28

## 2022-12-27 RX ORDER — CEFTRIAXONE 500 MG/1
2000 INJECTION, POWDER, FOR SOLUTION INTRAMUSCULAR; INTRAVENOUS EVERY 12 HOURS
Refills: 0 | Status: COMPLETED | OUTPATIENT
Start: 2022-12-27 | End: 2022-12-28

## 2022-12-27 RX ORDER — CEFTRIAXONE 500 MG/1
INJECTION, POWDER, FOR SOLUTION INTRAMUSCULAR; INTRAVENOUS
Refills: 0 | Status: DISCONTINUED | OUTPATIENT
Start: 2022-12-27 | End: 2022-12-27

## 2022-12-27 RX ORDER — LANOLIN ALCOHOL/MO/W.PET/CERES
5 CREAM (GRAM) TOPICAL AT BEDTIME
Refills: 0 | Status: DISCONTINUED | OUTPATIENT
Start: 2022-12-27 | End: 2022-12-29

## 2022-12-27 RX ORDER — CEFTRIAXONE 500 MG/1
2 INJECTION, POWDER, FOR SOLUTION INTRAMUSCULAR; INTRAVENOUS ONCE
Refills: 0 | Status: DISCONTINUED | OUTPATIENT
Start: 2022-12-27 | End: 2022-12-27

## 2022-12-27 RX ADMIN — Medication 100 MILLIGRAM(S): at 05:53

## 2022-12-27 RX ADMIN — CEFTRIAXONE 100 MILLIGRAM(S): 500 INJECTION, POWDER, FOR SOLUTION INTRAMUSCULAR; INTRAVENOUS at 12:19

## 2022-12-27 RX ADMIN — Medication 250 MILLIGRAM(S): at 14:10

## 2022-12-27 RX ADMIN — SODIUM CHLORIDE 150 MILLILITER(S): 9 INJECTION INTRAMUSCULAR; INTRAVENOUS; SUBCUTANEOUS at 19:13

## 2022-12-27 RX ADMIN — Medication 5 MILLIGRAM(S): at 03:40

## 2022-12-27 RX ADMIN — Medication 15 MILLIGRAM(S): at 09:48

## 2022-12-27 RX ADMIN — Medication 15 MILLIGRAM(S): at 10:03

## 2022-12-27 RX ADMIN — TRAMADOL HYDROCHLORIDE 25 MILLIGRAM(S): 50 TABLET ORAL at 05:53

## 2022-12-27 RX ADMIN — TRAMADOL HYDROCHLORIDE 25 MILLIGRAM(S): 50 TABLET ORAL at 21:50

## 2022-12-27 RX ADMIN — SODIUM CHLORIDE 150 MILLILITER(S): 9 INJECTION INTRAMUSCULAR; INTRAVENOUS; SUBCUTANEOUS at 14:10

## 2022-12-27 NOTE — CONSULT NOTE ADULT - ASSESSMENT
37 y Male with PMHx of percocet abuse presented to the ED for testicular pain. Pt was just evaluated in the ed hours before current presentation and found to have epididymitis. Pain started Maxton georgette with testicular swelling. Pt given ceftriaxone and told to follow up with urologist. However, pt returned back endorses testicular pain is still 10/10, constant, radiating to the lower belly.     Pt has had increased frequency of urination since 2020 and knew he had some kidney stones but never saw a doctor. Pt is monogamous with his wife for years.    IMPRESSION;   Acute right epididymoorchitis  No abscess  12/25 UCx E coli  WBC 24.4    RECOMMENDATIONS;  Monitor WBC  Rocephin 2 gm iv q12h for 2 dosis  then 2 gm iv q24h  Gentamicin 350 mg iv q24h ( maybe 2 dosis )

## 2022-12-27 NOTE — PROGRESS NOTE ADULT - SUBJECTIVE AND OBJECTIVE BOX
TRACY PINEDA 37y Male  MRN#: [id number]   Hospital Day: 1d    SUBJECTIVE  Patient is a 37y old Male who presents with a chief complaint of Currently admitted to medicine with the primary diagnosis of Epididymitis      INTERVAL HPI AND OVERNIGHT EVENTS:  Patient was examined and seen at bedside. This morning he is resting comfortably in bed. Reports he still has pain and required morphine overnight. Thinks the testicular swelling has gone down a bit.     OBJECTIVE  PAST MEDICAL & SURGICAL HISTORY    ALLERGIES:  No Known Allergies    MEDICATIONS:  STANDING MEDICATIONS  cefTRIAXone   IVPB      gentamicin   IVPB 350 milliGRAM(s) IV Intermittent every 24 hours  influenza   Vaccine 0.5 milliLiter(s) IntraMuscular once    PRN MEDICATIONS  acetaminophen     Tablet .. 650 milliGRAM(s) Oral every 6 hours PRN  ketorolac   Injectable 15 milliGRAM(s) IV Push every 12 hours PRN  melatonin 5 milliGRAM(s) Oral at bedtime PRN  ondansetron Injectable 4 milliGRAM(s) IV Push every 8 hours PRN  traMADol 25 milliGRAM(s) Oral every 8 hours PRN      VITAL SIGNS: Last 24 Hours  T(C): 36.8 (27 Dec 2022 05:00), Max: 37.7 (26 Dec 2022 15:50)  T(F): 98.2 (27 Dec 2022 05:00), Max: 99.8 (26 Dec 2022 15:50)  HR: 61 (27 Dec 2022 05:00) (61 - 94)  BP: 97/52 (27 Dec 2022 05:00) (97/52 - 112/59)  BP(mean): --  RR: 18 (27 Dec 2022 05:00) (18 - 18)  SpO2: 100% (26 Dec 2022 15:50) (100% - 100%)    LABS:                        12.2   24.41 )-----------( 277      ( 27 Dec 2022 05:27 )             34.8         138  |  99  |  12  ----------------------------<  83  4.1   |  29  |  0.8    Ca    9.2      27 Dec 2022 05:27  Mg     1.9         TPro  6.3  /  Alb  4.0  /  TBili  0.9  /  DBili  x   /  AST  18  /  ALT  21  /  AlkPhos  82        Urinalysis Basic - ( 25 Dec 2022 18:44 )    Color: Yellow / Appearance: Slightly Turbid / S.031 / pH: x  Gluc: x / Ketone: Large  / Bili: Negative / Urobili: <2 mg/dL   Blood: x / Protein: 30 mg/dL / Nitrite: Negative   Leuk Esterase: Large / RBC: 7 /HPF /  /HPF   Sq Epi: x / Non Sq Epi: 0 /HPF / Bacteria: Negative            Culture - Urine (collected 25 Dec 2022 18:44)  Source: Clean Catch Clean Catch (Midstream)  Preliminary Report (27 Dec 2022 08:03):    >100,000 CFU/ml Escherichia coli          RADIOLOGY:      PHYSICAL EXAM:  CONSTITUTIONAL: No acute distress, AAOx3  HEAD: Atraumatic, normocephalic  EYES: EOM intact, PERRLA, conjunctiva and sclera clear  ENT: moist mucous membranes  PULMONARY: Clear to auscultation bilaterally  CARDIOVASCULAR: Regular rate and rhythm  GASTROINTESTINAL: Soft, non-tender, non-distended; bowel sounds present  MUSCULOSKELETAL: no edema  NEUROLOGY: non-focal  SKIN: warm and dry     TRACY PINEDA 37y Male  MRN#: [id number]   Hospital Day: 1d    SUBJECTIVE  Patient is a 37y old Male who presents with a chief complaint of Currently admitted to medicine with the primary diagnosis of Epididymitis      INTERVAL HPI AND OVERNIGHT EVENTS:  Patient was examined and seen at bedside. This morning he is resting comfortably in bed. Reports he still has pain and required morphine overnight. Thinks the testicular swelling has gone down a bit.     OBJECTIVE  PAST MEDICAL & SURGICAL HISTORY    ALLERGIES:  No Known Allergies    MEDICATIONS:  STANDING MEDICATIONS  cefTRIAXone   IVPB      gentamicin   IVPB 350 milliGRAM(s) IV Intermittent every 24 hours  influenza   Vaccine 0.5 milliLiter(s) IntraMuscular once    PRN MEDICATIONS  acetaminophen     Tablet .. 650 milliGRAM(s) Oral every 6 hours PRN  ketorolac   Injectable 15 milliGRAM(s) IV Push every 12 hours PRN  melatonin 5 milliGRAM(s) Oral at bedtime PRN  ondansetron Injectable 4 milliGRAM(s) IV Push every 8 hours PRN  traMADol 25 milliGRAM(s) Oral every 8 hours PRN      VITAL SIGNS: Last 24 Hours  T(C): 36.8 (27 Dec 2022 05:00), Max: 37.7 (26 Dec 2022 15:50)  T(F): 98.2 (27 Dec 2022 05:00), Max: 99.8 (26 Dec 2022 15:50)  HR: 61 (27 Dec 2022 05:00) (61 - 94)  BP: 97/52 (27 Dec 2022 05:00) (97/52 - 112/59)  BP(mean): --  RR: 18 (27 Dec 2022 05:00) (18 - 18)  SpO2: 100% (26 Dec 2022 15:50) (100% - 100%)    LABS:                        12.2   24.41 )-----------( 277      ( 27 Dec 2022 05:27 )             34.8         138  |  99  |  12  ----------------------------<  83  4.1   |  29  |  0.8    Ca    9.2      27 Dec 2022 05:27  Mg     1.9         TPro  6.3  /  Alb  4.0  /  TBili  0.9  /  DBili  x   /  AST  18  /  ALT  21  /  AlkPhos  82        Urinalysis Basic - ( 25 Dec 2022 18:44 )    Color: Yellow / Appearance: Slightly Turbid / S.031 / pH: x  Gluc: x / Ketone: Large  / Bili: Negative / Urobili: <2 mg/dL   Blood: x / Protein: 30 mg/dL / Nitrite: Negative   Leuk Esterase: Large / RBC: 7 /HPF /  /HPF   Sq Epi: x / Non Sq Epi: 0 /HPF / Bacteria: Negative            Culture - Urine (collected 25 Dec 2022 18:44)  Source: Clean Catch Clean Catch (Midstream)  Preliminary Report (27 Dec 2022 08:03):    >100,000 CFU/ml Escherichia coli          RADIOLOGY:      PHYSICAL EXAM:  CONSTITUTIONAL: No acute distress, AAOx3  HEAD: Atraumatic, normocephalic  EYES: EOM intact, PERRLA, conjunctiva and sclera clear  ENT: moist mucous membranes  PULMONARY: Clear to auscultation bilaterally  CARDIOVASCULAR: Regular rate and rhythm  GASTROINTESTINAL: Soft, non-tender, non-distended; bowel sounds present  : testicular swelling; tender to palpation  MUSCULOSKELETAL: no edema  NEUROLOGY: non-focal  SKIN: warm and dry

## 2022-12-27 NOTE — CONSULT NOTE ADULT - TIME BILLING
Counseled patient about diagnostic testing and treatment plan. All questions answered. Abnormal lab/radiographical/microbiological data reviewed.
I was physically present for the key portions of the evaluation and management [ E/M] service provided and agree with the plan which i have reviewed and edited where appropriate

## 2022-12-27 NOTE — CONSULT NOTE ADULT - SUBJECTIVE AND OBJECTIVE BOX
TRACY PINEDA  37y, Male  Allergy: No Known Allergies      All historical available data reviewed.    HPI:  37 y Male with PMHx of percocet abuse presented to the ED for testicular pain. Pt was just evaluated in the ed hours before current presentation and found to have epididymitis. Pain started [place] georgette with testicular swelling. Pt given ceftriaxone and told to follow up with urologist. However, pt returned back endorses testicular pain is still 10/10, constant, radiating to the lower belly.     Pt has had increased frequency of urination since  and knew he had some kidney stones but never saw a doctor. Pt is monogamous with his significant other for years. Pt Denies burning or new lesions but endorses hematuria.    ED vitals:   /70, HR 94, RR 17, Temp 99.9, O2 99% on RA    Sig labs:   WBC 22K otherwise unremarkable    UA: +ve for LE    Ct abd pelvis with IV cont:  No hydronephrosis or nephrolithiasis. Evaluation for   intrarenal stones limited by the presence of intravenous contrast.  Large liver with mild fatty infiltration. Distended gallbladder with no   radiopaque gallstones identified.    US testicles:   Right testicular and epididymis hyperemia consistent with   epididymoorchitis in appropriate clinical setting. No drainable fluid   collection.    Pt was given ceftriaxone and levaquin in the ED, Pt was supposed to be sent home from the ED but said pain was unbearable so he was admitted     (26 Dec 2022 10:04)    FAMILY HISTORY:    PAST MEDICAL & SURGICAL HISTORY:        VITALS:  T(F): 98.2, Max: 99.8 (22 @ 15:50)  HR: 61  BP: 97/52  RR: 18Vital Signs Last 24 Hrs  T(C): 36.8 (27 Dec 2022 05:00), Max: 37.7 (26 Dec 2022 15:50)  T(F): 98.2 (27 Dec 2022 05:00), Max: 99.8 (26 Dec 2022 15:50)  HR: 61 (27 Dec 2022 05:00) (61 - 94)  BP: 97/52 (27 Dec 2022 05:00) (97/52 - 112/59)  BP(mean): --  RR: 18 (27 Dec 2022 05:00) (18 - 18)  SpO2: 100% (26 Dec 2022 15:50) (100% - 100%)    Parameters below as of 26 Dec 2022 15:50  Patient On (Oxygen Delivery Method): room air        TESTS & MEASUREMENTS:                        12.2   24.41 )-----------( 277      ( 27 Dec 2022 05:27 )             34.8         138  |  99  |  12  ----------------------------<  83  4.1   |  29  |  0.8    Ca    9.2      27 Dec 2022 05:27  Mg     1.9         TPro  6.3  /  Alb  4.0  /  TBili  0.9  /  DBili  x   /  AST  18  /  ALT  21  /  AlkPhos  82      LIVER FUNCTIONS - ( 27 Dec 2022 05:27 )  Alb: 4.0 g/dL / Pro: 6.3 g/dL / ALK PHOS: 82 U/L / ALT: 21 U/L / AST: 18 U/L / GGT: x             Culture - Urine (collected 22 @ 18:44)  Source: Clean Catch Clean Catch (Midstream)  Preliminary Report (22 @ 08:03):    >100,000 CFU/ml Escherichia coli      Urinalysis Basic - ( 25 Dec 2022 18:44 )    Color: Yellow / Appearance: Slightly Turbid / S.031 / pH: x  Gluc: x / Ketone: Large  / Bili: Negative / Urobili: <2 mg/dL   Blood: x / Protein: 30 mg/dL / Nitrite: Negative   Leuk Esterase: Large / RBC: 7 /HPF /  /HPF   Sq Epi: x / Non Sq Epi: 0 /HPF / Bacteria: Negative          RADIOLOGY & ADDITIONAL TESTS:  Personal review of radiological diagnostics performed  Echo and EKG results noted when applicable.     MEDICATIONS:  acetaminophen     Tablet .. 650 milliGRAM(s) Oral every 6 hours PRN  cefTRIAXone   IVPB 1000 milliGRAM(s) IV Intermittent every 24 hours  doxycycline monohydrate Capsule 100 milliGRAM(s) Oral every 12 hours  influenza   Vaccine 0.5 milliLiter(s) IntraMuscular once  ketorolac   Injectable 15 milliGRAM(s) IV Push every 12 hours PRN  melatonin 5 milliGRAM(s) Oral at bedtime PRN  ondansetron Injectable 4 milliGRAM(s) IV Push every 8 hours PRN  traMADol 25 milliGRAM(s) Oral every 8 hours PRN      ANTIBIOTICS:  cefTRIAXone   IVPB 1000 milliGRAM(s) IV Intermittent every 24 hours  doxycycline monohydrate Capsule 100 milliGRAM(s) Oral every 12 hours

## 2022-12-27 NOTE — CONSULT NOTE ADULT - CONSULT REQUESTED DATE/TIME
"Care Management Initial Consult    General Information  Assessment completed with: Spouse or significant other, Shannan  via phone  Type of CM/SW Visit: Initial Assessment    Primary Care Provider verified and updated as needed: Yes   Readmission within the last 30 days: no previous admission in last 30 days      Reason for Consult: discharge planning  Advance Care Planning: Advance Care Planning Reviewed: other (comment) (\"does not have one\")          Communication Assessment  Patient's communication style: spoken language (English or Bilingual)             Cognitive  Cognitive/Neuro/Behavioral: WDL                      Living Environment:   People in home: spouse  Shannan  Current living Arrangements: house (\"1 level town house\")      Able to return to prior arrangements: yes       Family/Social Support:  Care provided by: self  Provides care for: no one  Marital Status:     Shannan       Description of Support System: Supportive,Involved    Support Assessment: Adequate family and caregiver support,Adequate social supports,Patient communicates needs well met    Current Resources:   Patient receiving home care services: No     Community Resources: None  Equipment currently used at home: none  Supplies currently used at home: None    Employment/Financial:  Employment Status: retired     Employment/ Comments: \"no  benefits\"  Financial Concerns:     Referral to Financial Counselor: No       Lifestyle & Psychosocial Needs:  Social Determinants of Health     Tobacco Use: Medium Risk     Smoking Tobacco Use: Former Smoker     Smokeless Tobacco Use: Never Used   Alcohol Use: Not on file   Financial Resource Strain: Not on file   Food Insecurity: Not on file   Transportation Needs: Not on file   Physical Activity: Not on file   Stress: Not on file   Social Connections: Not on file   Intimate Partner Violence: Not on file   Depression: Not on file   Housing Stability: Not on file "
"      Functional Status:  Prior to admission patient needed assistance:   Dependent ADLs:: Ambulation-no assistive device,Independent  Dependent IADLs:: Independent (\" does not do any driving\")  Assesssment of Functional Status: Not at baseline with ADL Functioning,Not at  functional baseline,Not at baseline with mobility    Mental Health Status:          Chemical Dependency Status:                Values/Beliefs:  Spiritual, Cultural Beliefs, Synagogue Practices, Values that affect care:                 Additional Information:  Felicity lives in a 1 level town house with her . She is independent with ADLs at baseline and still drives. Per , \"she is a very independent person and even when she has been sick she likes to do things on her own\".    May need Home O2.    Son or daughter in law to transport at discharge.    Latoya Knight RN      "
27-Dec-2022 10:35
26-Dec-2022 06:50

## 2022-12-27 NOTE — PROGRESS NOTE ADULT - ASSESSMENT
ASSESSMENT & PLAN:  37 y Male with PMHx of percocet abuse presented to the ED for testicular pain.. Admitted pain control and epididymoorchitis.    #epididymoorchitis  #Sepsis on admission (elevated white count, HR >90)  - CT abd pelvis with IV cont:  No hydronephrosis or nephrolithiasis. Evaluation for intrarenal stones limited by the presence of intravenous contrast. Large liver with mild fatty infiltration. Distended gallbladder with no radiopaque gallstones identified.  - Right testicular and epididymis hyperemia consistent with epididymoorchitis in appropriate clinical setting. No drainable fluid collection.  - d/c'd doxy   - per ID recs - Rocephin 2gm IV q12 for 2 doses; starting 12/28 rochepin 2gm q24  - start gentamicin 350mg q24 for 2 doses  - pain control with toradol  - Uro following: No acute surgical urologic intervention needed, IV abx, urine cx, std panel, urine cytology  - fup STD panel  - fup UCx        #Misc  - DVT Prophylaxis:  - GI Prophylaxis:  - Diet:  - Activity:  - IV Fluids:  - Code Status:    Dispo: ASSESSMENT & PLAN:  37 y Male with PMHx of percocet abuse presented to the ED for testicular pain.. Admitted pain control and epididymoorchitis.    #acute right epididymoorchitis  #Sepsis on admission (elevated white count, HR >90)  - CT abd pelvis with IV cont:  No hydronephrosis or nephrolithiasis. Evaluation for intrarenal stones limited by the presence of intravenous contrast. Large liver with mild fatty infiltration. Distended gallbladder with no radiopaque gallstones identified.  - Right testicular and epididymis hyperemia consistent with epididymoorchitis in appropriate clinical setting. No drainable fluid collection.  - no abscess  - d/c'd doxy   - HIV neg  - fup STD panel  - 12/25 UCx - E coli  - f/u BCx, UCx  - Uro following: No acute surgical urologic intervention needed, IV abx, urine cx, std panel, urine cytology  - per ID recs - Rocephin 2gm IV q12 for 2 doses; starting 12/28 rochepin 2gm q24  - start gentamicin 350mg q24 for 2 doses  - pain control with toradol  - NS 150cc/hr for 24 hr    #Misc  - DVT Prophylaxis: none  - GI Prophylaxis: none  - Diet: regular  - Activity: IAT  - IV Fluids: NS 150cc/hr  - Code Status: full

## 2022-12-28 LAB
-  AMIKACIN: SIGNIFICANT CHANGE UP
-  AMOXICILLIN/CLAVULANIC ACID: SIGNIFICANT CHANGE UP
-  AMPICILLIN/SULBACTAM: SIGNIFICANT CHANGE UP
-  AMPICILLIN: SIGNIFICANT CHANGE UP
-  AZTREONAM: SIGNIFICANT CHANGE UP
-  CEFAZOLIN: SIGNIFICANT CHANGE UP
-  CEFEPIME: SIGNIFICANT CHANGE UP
-  CEFOXITIN: SIGNIFICANT CHANGE UP
-  CEFTRIAXONE: SIGNIFICANT CHANGE UP
-  CEFUROXIME: SIGNIFICANT CHANGE UP
-  CIPROFLOXACIN: SIGNIFICANT CHANGE UP
-  ERTAPENEM: SIGNIFICANT CHANGE UP
-  GENTAMICIN: SIGNIFICANT CHANGE UP
-  IMIPENEM: SIGNIFICANT CHANGE UP
-  LEVOFLOXACIN: SIGNIFICANT CHANGE UP
-  MEROPENEM: SIGNIFICANT CHANGE UP
-  NITROFURANTOIN: SIGNIFICANT CHANGE UP
-  PIPERACILLIN/TAZOBACTAM: SIGNIFICANT CHANGE UP
-  TOBRAMYCIN: SIGNIFICANT CHANGE UP
-  TRIMETHOPRIM/SULFAMETHOXAZOLE: SIGNIFICANT CHANGE UP
ALBUMIN SERPL ELPH-MCNC: 3.5 G/DL — SIGNIFICANT CHANGE UP (ref 3.5–5.2)
ALP SERPL-CCNC: 56 U/L — SIGNIFICANT CHANGE UP (ref 30–115)
ALT FLD-CCNC: 16 U/L — SIGNIFICANT CHANGE UP (ref 0–41)
ANION GAP SERPL CALC-SCNC: 11 MMOL/L — SIGNIFICANT CHANGE UP (ref 7–14)
AST SERPL-CCNC: 11 U/L — SIGNIFICANT CHANGE UP (ref 0–41)
BASOPHILS # BLD AUTO: 0.03 K/UL — SIGNIFICANT CHANGE UP (ref 0–0.2)
BASOPHILS NFR BLD AUTO: 0.2 % — SIGNIFICANT CHANGE UP (ref 0–1)
BILIRUB SERPL-MCNC: 0.4 MG/DL — SIGNIFICANT CHANGE UP (ref 0.2–1.2)
BUN SERPL-MCNC: 13 MG/DL — SIGNIFICANT CHANGE UP (ref 10–20)
CALCIUM SERPL-MCNC: 8.5 MG/DL — SIGNIFICANT CHANGE UP (ref 8.4–10.5)
CHLORIDE SERPL-SCNC: 106 MMOL/L — SIGNIFICANT CHANGE UP (ref 98–110)
CO2 SERPL-SCNC: 25 MMOL/L — SIGNIFICANT CHANGE UP (ref 17–32)
CREAT SERPL-MCNC: 0.7 MG/DL — SIGNIFICANT CHANGE UP (ref 0.7–1.5)
CULTURE RESULTS: SIGNIFICANT CHANGE UP
EGFR: 122 ML/MIN/1.73M2 — SIGNIFICANT CHANGE UP
EOSINOPHIL # BLD AUTO: 0.05 K/UL — SIGNIFICANT CHANGE UP (ref 0–0.7)
EOSINOPHIL NFR BLD AUTO: 0.4 % — SIGNIFICANT CHANGE UP (ref 0–8)
GLUCOSE SERPL-MCNC: 92 MG/DL — SIGNIFICANT CHANGE UP (ref 70–99)
HCT VFR BLD CALC: 33.6 % — LOW (ref 42–52)
HGB BLD-MCNC: 11.6 G/DL — LOW (ref 14–18)
IMM GRANULOCYTES NFR BLD AUTO: 0.4 % — HIGH (ref 0.1–0.3)
LYMPHOCYTES # BLD AUTO: 1.69 K/UL — SIGNIFICANT CHANGE UP (ref 1.2–3.4)
LYMPHOCYTES # BLD AUTO: 13.8 % — LOW (ref 20.5–51.1)
MAGNESIUM SERPL-MCNC: 1.9 MG/DL — SIGNIFICANT CHANGE UP (ref 1.8–2.4)
MCHC RBC-ENTMCNC: 31.8 PG — HIGH (ref 27–31)
MCHC RBC-ENTMCNC: 34.5 G/DL — SIGNIFICANT CHANGE UP (ref 32–37)
MCV RBC AUTO: 92.1 FL — SIGNIFICANT CHANGE UP (ref 80–94)
METHOD TYPE: SIGNIFICANT CHANGE UP
MONOCYTES # BLD AUTO: 0.97 K/UL — HIGH (ref 0.1–0.6)
MONOCYTES NFR BLD AUTO: 7.9 % — SIGNIFICANT CHANGE UP (ref 1.7–9.3)
NEUTROPHILS # BLD AUTO: 9.44 K/UL — HIGH (ref 1.4–6.5)
NEUTROPHILS NFR BLD AUTO: 77.3 % — HIGH (ref 42.2–75.2)
NRBC # BLD: 0 /100 WBCS — SIGNIFICANT CHANGE UP (ref 0–0)
ORGANISM # SPEC MICROSCOPIC CNT: SIGNIFICANT CHANGE UP
ORGANISM # SPEC MICROSCOPIC CNT: SIGNIFICANT CHANGE UP
PLATELET # BLD AUTO: 302 K/UL — SIGNIFICANT CHANGE UP (ref 130–400)
POTASSIUM SERPL-MCNC: 4.1 MMOL/L — SIGNIFICANT CHANGE UP (ref 3.5–5)
POTASSIUM SERPL-SCNC: 4.1 MMOL/L — SIGNIFICANT CHANGE UP (ref 3.5–5)
PROT SERPL-MCNC: 5.4 G/DL — LOW (ref 6–8)
RBC # BLD: 3.65 M/UL — LOW (ref 4.7–6.1)
RBC # FLD: 13.2 % — SIGNIFICANT CHANGE UP (ref 11.5–14.5)
SODIUM SERPL-SCNC: 142 MMOL/L — SIGNIFICANT CHANGE UP (ref 135–146)
SPECIMEN SOURCE: SIGNIFICANT CHANGE UP
WBC # BLD: 12.23 K/UL — HIGH (ref 4.8–10.8)
WBC # FLD AUTO: 12.23 K/UL — HIGH (ref 4.8–10.8)

## 2022-12-28 PROCEDURE — 99232 SBSQ HOSP IP/OBS MODERATE 35: CPT

## 2022-12-28 RX ORDER — ZOLPIDEM TARTRATE 10 MG/1
5 TABLET ORAL ONCE
Refills: 0 | Status: DISCONTINUED | OUTPATIENT
Start: 2022-12-28 | End: 2022-12-29

## 2022-12-28 RX ADMIN — TRAMADOL HYDROCHLORIDE 25 MILLIGRAM(S): 50 TABLET ORAL at 11:24

## 2022-12-28 RX ADMIN — Medication 15 MILLIGRAM(S): at 01:26

## 2022-12-28 RX ADMIN — TRAMADOL HYDROCHLORIDE 25 MILLIGRAM(S): 50 TABLET ORAL at 11:59

## 2022-12-28 RX ADMIN — Medication 5 MILLIGRAM(S): at 01:30

## 2022-12-28 RX ADMIN — SODIUM CHLORIDE 150 MILLILITER(S): 9 INJECTION INTRAMUSCULAR; INTRAVENOUS; SUBCUTANEOUS at 08:53

## 2022-12-28 RX ADMIN — CEFTRIAXONE 100 MILLIGRAM(S): 500 INJECTION, POWDER, FOR SOLUTION INTRAMUSCULAR; INTRAVENOUS at 03:30

## 2022-12-28 RX ADMIN — Medication 250 MILLIGRAM(S): at 10:17

## 2022-12-28 RX ADMIN — Medication 15 MILLIGRAM(S): at 01:56

## 2022-12-28 RX ADMIN — CEFTRIAXONE 100 MILLIGRAM(S): 500 INJECTION, POWDER, FOR SOLUTION INTRAMUSCULAR; INTRAVENOUS at 11:16

## 2022-12-28 NOTE — PROGRESS NOTE ADULT - SUBJECTIVE AND OBJECTIVE BOX
Patient is a 37y old  Male who presents with a chief complaint of testicular pain     Patient seen and examined at bedside.    ALLERGIES:  No Known Allergies    MEDICATIONS:  acetaminophen     Tablet .. 650 milliGRAM(s) Oral every 6 hours PRN  cefTRIAXone   IVPB 2000 milliGRAM(s) IV Intermittent every 24 hours  influenza   Vaccine 0.5 milliLiter(s) IntraMuscular once  melatonin 5 milliGRAM(s) Oral at bedtime PRN  ondansetron Injectable 4 milliGRAM(s) IV Push every 8 hours PRN  sodium chloride 0.9%. 1000 milliLiter(s) IV Continuous <Continuous>  zolpidem 5 milliGRAM(s) Oral once PRN    Vital Signs Last 24 Hrs  T(F): 99 (28 Dec 2022 13:15), Max: 99 (28 Dec 2022 13:15)  HR: 73 (28 Dec 2022 13:15) (69 - 77)  BP: 114/77 (28 Dec 2022 13:15) (98/55 - 114/77)  RR: 18 (28 Dec 2022 04:57) (18 - 18)  SpO2: 100% (27 Dec 2022 20:16) (100% - 100%)  I&O's Summary    27 Dec 2022 07:01  -  28 Dec 2022 07:00  --------------------------------------------------------  IN: 1200 mL / OUT: 0 mL / NET: 1200 mL        PHYSICAL EXAM:  General: NAD, A/O x 3  ENT: MMM  Neck: Supple, No JVD  Lungs: Clear to auscultation bilaterally  Cardio: RRR, S1/S2, No murmurs  Abdomen: Soft, Nontender, Nondistended; Bowel sounds present  Extremities: No cyanosis, No edema    LABS:                        11.6   12.23 )-----------( 302      ( 28 Dec 2022 08:41 )             33.6         142  |  106  |  13  ----------------------------<  92  4.1   |  25  |  0.7    Ca    8.5      28 Dec 2022 08:41  Mg     1.9         TPro  5.4  /  Alb  3.5  /  TBili  0.4  /  DBili  x   /  AST  11  /  ALT  16  /  AlkPhos  56                                  Urinalysis Basic - ( 25 Dec 2022 18:44 )    Color: Yellow / Appearance: Slightly Turbid / S.031 / pH: x  Gluc: x / Ketone: Large  / Bili: Negative / Urobili: <2 mg/dL   Blood: x / Protein: 30 mg/dL / Nitrite: Negative   Leuk Esterase: Large / RBC: 7 /HPF /  /HPF   Sq Epi: x / Non Sq Epi: 0 /HPF / Bacteria: Negative        Culture - Blood (collected 26 Dec 2022 13:28)  Source: .Blood Blood-Peripheral  Preliminary Report (27 Dec 2022 22:02):    No growth to date.    Culture - Urine (collected 26 Dec 2022 11:16)  Source: Catheterized Catheterized  Final Report (27 Dec 2022 20:01):    No growth    Culture - Urine (collected 25 Dec 2022 18:44)  Source: Clean Catch Clean Catch (Midstream)  Final Report (28 Dec 2022 10:17):    >100,000 CFU/ml Escherichia coli  Organism: Escherichia coli (28 Dec 2022 10:17)  Organism: Escherichia coli (28 Dec 2022 10:17)      -  Amikacin: S <=16      -  Amoxicillin/Clavulanic Acid: S <=8/4      -  Ampicillin: S <=8 These ampicillin results predict results for amoxicillin      -  Ampicillin/Sulbactam: S <=4/2 Enterobacter, Klebsiella aerogenes, Citrobacter, and Serratia may develop resistance during prolonged therapy (3-4 days)      -  Aztreonam: S <=4      -  Cefazolin: S <=2 For uncomplicated UTI with K. pneumoniae, E. coli, or P. mirablis: RACHEAL <=16 is sensitive and RACHEAL >=32 is resistant. This also predicts results for oral agents cefaclor, cefdinir, cefpodoxime, cefprozil, cefuroxime axetil, cephalexin and locarbef for uncomplicated UTI. Note that some isolates may be susceptible to these agents while testing resistant to cefazolin.      -  Cefepime: S <=2      -  Cefoxitin: S <=8      -  Ceftriaxone: S <=1 Enterobacter, Klebsiella aerogenes, Citrobacter, and Serratia may develop resistance during prolonged therapy      -  Cefuroxime: S <=4      -  Ciprofloxacin: S <=0.25      -  Ertapenem: S <=0.5      -  Gentamicin: S <=2      -  Imipenem: S <=1      -  Levofloxacin: S <=0.5      -  Meropenem: S <=1      -  Nitrofurantoin: S <=32 Should not be used to treat pyelonephritis      -  Piperacillin/Tazobactam: S <=8      -  Tobramycin: S <=2      -  Trimethoprim/Sulfamethoxazole: S <=0.5/9.5      Method Type: RACHEAL      COVID-19 PCR: NotDetec (22 @ 03:30)      RADIOLOGY & ADDITIONAL TESTS:  < from: US Testicles (22 @ 09:26) >    IMPRESSION:    Right sided epididymoorchitis without change.    < end of copied text >  < from: CT Abdomen and Pelvis w/ IV Cont (22 @ 20:03) >  IMPRESSION: No hydronephrosis or nephrolithiasis. Evaluation for   intrarenal stones limited by the presence of intravenous contrast.    Large liver with mild fatty infiltration. Distended gallbladder with no   radiopaque gallstones identified.    < end of copied text >    Care Discussed with Consultants/Other Providers:

## 2022-12-28 NOTE — PROGRESS NOTE ADULT - ASSESSMENT
ASSESSMENT & PLAN:  37 y Male with PMHx of percocet abuse presented to the ED for testicular pain.. Admitted pain control and epididymoorchitis.    #acute right epididymoorchitis  #Sepsis on admission (elevated white count, HR >90)  - CT abd pelvis with IV cont:  No hydronephrosis or nephrolithiasis. Evaluation for intrarenal stones limited by the presence of intravenous contrast. Large liver with mild fatty infiltration. Distended gallbladder with no radiopaque gallstones identified.  - Right testicular and epididymis hyperemia consistent with epididymoorchitis in appropriate clinical setting. No drainable fluid collection.  - no abscess  - d/c'd doxy   - HIV neg, grupo/ chlamydia -negative   - 12/25 UCx - E coli pan sensitive  - f/u BCx,   - Uro following: No acute surgical urologic intervention needed, IV abx, urine cx, std panel, urine cytology  - per ID recs - Rocephin 2gm IV q12 for 2 doses (12/27)  - c/w 12/28 rochepin 2gm q24  - c/w gentamicin 350mg q24 for 2 doses (dose 2 today 12/28)  - pain control with toradol  - NS 150cc/hr for 24 hr    #Misc  - DVT Prophylaxis: none  - GI Prophylaxis: none  - Diet: regular  - Activity: IAT  - IV Fluids: NS 150cc/hr  - Code Status: full  - Pending: final ID plan

## 2022-12-28 NOTE — PROGRESS NOTE ADULT - ASSESSMENT
· Assessment	  37 y Male with PMHx of percocet abuse presented to the ED for testicular pain. Pt was just evaluated in the ed hours before current presentation and found to have epididymitis. Pain started [place] georgette with testicular swelling. Pt given ceftriaxone and told to follow up with urologist. However, pt returned back endorses testicular pain is still 10/10, constant, radiating to the lower belly.     Pt has had increased frequency of urination since 2020 and knew he had some kidney stones but never saw a doctor. Pt is monogamous with his wife for years.    IMPRESSION;   Acute right epididymoorchitis  No abscess  12/25 UCx E coli  12/26 UCx NG  12/26 BCx NG  HIV NG  GC/chlamydia NG  WBC 24.4    RECOMMENDATIONS;  Monitor WBC  NSAIDS  Rocephin  2 gm iv q24h  Gentamicin 350 mg iv q24h

## 2022-12-28 NOTE — PROGRESS NOTE ADULT - SUBJECTIVE AND OBJECTIVE BOX
TRACY PINEDA  37y, Male    All available historical data reviewed    OVERNIGHT EVENTS:  none    ROS:  General: Denies rigors, nightsweats  HEENT: Denies headache, rhinorrhea, sore throat, eye pain  CV: Denies CP, palpitations  PULM: Denies wheezing, hemoptysis  GI: Denies hematemesis, hematochezia, melena  : Denies discharge, hematuria  MSK: Denies arthralgias, myalgias  SKIN: Denies rash, lesions  NEURO: Denies paresthesias, weakness  PSYCH: Denies depression, anxiety    VITALS:  T(F): 98.4, Max: 99 (12-27-22 @ 14:05)  HR: 69  BP: 105/56  RR: 18Vital Signs Last 24 Hrs  T(C): 36.9 (28 Dec 2022 04:57), Max: 37.2 (27 Dec 2022 14:05)  T(F): 98.4 (28 Dec 2022 04:57), Max: 99 (27 Dec 2022 14:05)  HR: 69 (28 Dec 2022 04:57) (69 - 79)  BP: 105/56 (28 Dec 2022 04:57) (98/55 - 105/56)  BP(mean): --  RR: 18 (28 Dec 2022 04:57) (18 - 18)  SpO2: 100% (27 Dec 2022 20:16) (100% - 100%)    Parameters below as of 27 Dec 2022 20:16  Patient On (Oxygen Delivery Method): room air        TESTS & MEASUREMENTS:                        12.2   24.41 )-----------( 277      ( 27 Dec 2022 05:27 )             34.8     12-27    138  |  99  |  12  ----------------------------<  83  4.1   |  29  |  0.8    Ca    9.2      27 Dec 2022 05:27  Mg     1.9     12-27    TPro  6.3  /  Alb  4.0  /  TBili  0.9  /  DBili  x   /  AST  18  /  ALT  21  /  AlkPhos  82  12-27    LIVER FUNCTIONS - ( 27 Dec 2022 05:27 )  Alb: 4.0 g/dL / Pro: 6.3 g/dL / ALK PHOS: 82 U/L / ALT: 21 U/L / AST: 18 U/L / GGT: x             Culture - Blood (collected 12-26-22 @ 13:28)  Source: .Blood Blood-Peripheral  Preliminary Report (12-27-22 @ 22:02):    No growth to date.    Culture - Urine (collected 12-26-22 @ 11:16)  Source: Catheterized Catheterized  Final Report (12-27-22 @ 20:01):    No growth    Culture - Urine (collected 12-25-22 @ 18:44)  Source: Clean Catch Clean Catch (Midstream)  Preliminary Report (12-27-22 @ 08:03):    >100,000 CFU/ml Escherichia coli            RADIOLOGY & ADDITIONAL TESTS:  Personal review of radiological diagnostics performed  Echo and EKG results noted when applicable.     MEDICATIONS:  acetaminophen     Tablet .. 650 milliGRAM(s) Oral every 6 hours PRN  cefTRIAXone   IVPB 2000 milliGRAM(s) IV Intermittent every 24 hours  gentamicin   IVPB 350 milliGRAM(s) IV Intermittent every 24 hours  influenza   Vaccine 0.5 milliLiter(s) IntraMuscular once  melatonin 5 milliGRAM(s) Oral at bedtime PRN  ondansetron Injectable 4 milliGRAM(s) IV Push every 8 hours PRN  sodium chloride 0.9%. 1000 milliLiter(s) IV Continuous <Continuous>  traMADol 25 milliGRAM(s) Oral every 8 hours PRN  zolpidem 5 milliGRAM(s) Oral once PRN      ANTIBIOTICS:  cefTRIAXone   IVPB 2000 milliGRAM(s) IV Intermittent every 24 hours  gentamicin   IVPB 350 milliGRAM(s) IV Intermittent every 24 hours

## 2022-12-28 NOTE — PROGRESS NOTE ADULT - ASSESSMENT
ASSESSMENT & PLAN:  37 y Male with PMHx of percocet abuse presented to the ED for testicular pain.. Admitted pain control and epididymoorchitis.    #acute right epididymoorchitis  #Sepsis on admission (elevated white count, HR >90)  - CT abd pelvis with IV cont:  No hydronephrosis or nephrolithiasis. Evaluation for intrarenal stones limited by the presence of intravenous contrast. Large liver with mild fatty infiltration. Distended gallbladder with no radiopaque gallstones identified.  - Right testicular and epididymis hyperemia consistent with epididymoorchitis in appropriate clinical setting. No drainable fluid collection.  - no abscess  - d/c'd doxy   - HIV neg  - fup STD panel  - 12/25 UCx - E coli  - f/u BCx, UCx  - Uro following: No acute surgical urologic intervention needed, IV abx, urine cx, std panel, urine cytology  - per ID recs - Rocephin 2gm IV q12 for 2 doses (12/27)  - c/w 12/28 rochepin 2gm q24  - c/w gentamicin 350mg q24 for 2 doses (dose 2 today 12/28)  - pain control with toradol  - NS 150cc/hr for 24 hr    #Misc  - DVT Prophylaxis: none  - GI Prophylaxis: none  - Diet: regular  - Activity: IAT  - IV Fluids: NS 150cc/hr  - Code Status: full

## 2022-12-28 NOTE — PROGRESS NOTE ADULT - SUBJECTIVE AND OBJECTIVE BOX
TRACY PINEDA 37y Male  MRN#: [id number]   Hospital Day: 2d    SUBJECTIVE  Patient is a 37y old Male who presents with a chief complaint of Currently admitted to medicine with the primary diagnosis of Epididymitis      INTERVAL HPI AND OVERNIGHT EVENTS:  Patient was examined and seen at bedside. This morning he is resting comfortably in bed. No overnight events.    OBJECTIVE  PAST MEDICAL & SURGICAL HISTORY    ALLERGIES:  No Known Allergies    MEDICATIONS:  STANDING MEDICATIONS  cefTRIAXone   IVPB 2000 milliGRAM(s) IV Intermittent every 24 hours  influenza   Vaccine 0.5 milliLiter(s) IntraMuscular once  sodium chloride 0.9%. 1000 milliLiter(s) IV Continuous <Continuous>    PRN MEDICATIONS  acetaminophen     Tablet .. 650 milliGRAM(s) Oral every 6 hours PRN  melatonin 5 milliGRAM(s) Oral at bedtime PRN  ondansetron Injectable 4 milliGRAM(s) IV Push every 8 hours PRN  traMADol 25 milliGRAM(s) Oral every 8 hours PRN  zolpidem 5 milliGRAM(s) Oral once PRN      VITAL SIGNS: Last 24 Hours  T(C): 36.9 (28 Dec 2022 04:57), Max: 37.2 (27 Dec 2022 14:05)  T(F): 98.4 (28 Dec 2022 04:57), Max: 99 (27 Dec 2022 14:05)  HR: 69 (28 Dec 2022 04:57) (69 - 79)  BP: 105/56 (28 Dec 2022 04:57) (98/55 - 105/56)  BP(mean): --  RR: 18 (28 Dec 2022 04:57) (18 - 18)  SpO2: 100% (27 Dec 2022 20:16) (100% - 100%)    LABS:                        11.6   12.23 )-----------( 302      ( 28 Dec 2022 08:41 )             33.6     12-27    138  |  99  |  12  ----------------------------<  83  4.1   |  29  |  0.8    Ca    9.2      27 Dec 2022 05:27  Mg     1.9     12-27    TPro  6.3  /  Alb  4.0  /  TBili  0.9  /  DBili  x   /  AST  18  /  ALT  21  /  AlkPhos  82  12-27              Culture - Blood (collected 26 Dec 2022 13:28)  Source: .Blood Blood-Peripheral  Preliminary Report (27 Dec 2022 22:02):    No growth to date.    Culture - Urine (collected 26 Dec 2022 11:16)  Source: Catheterized Catheterized  Final Report (27 Dec 2022 20:01):    No growth    Culture - Urine (collected 25 Dec 2022 18:44)  Source: Clean Catch Clean Catch (Midstream)  Final Report (28 Dec 2022 10:17):    >100,000 CFU/ml Escherichia coli  Organism: Escherichia coli (28 Dec 2022 10:17)  Organism: Escherichia coli (28 Dec 2022 10:17)          RADIOLOGY:      PHYSICAL EXAM:  CONSTITUTIONAL: No acute distress, AAOx3  HEAD: Atraumatic, normocephalic  EYES: EOM intact, PERRLA, conjunctiva and sclera clear  ENT: moist mucous membranes  PULMONARY: Clear to auscultation bilaterally  CARDIOVASCULAR: Regular rate and rhythm  GASTROINTESTINAL: Soft, non-tender, non-distended; bowel sounds present  : testicular swelling; tender to palpation  MUSCULOSKELETAL: no edema  NEUROLOGY: non-focal  SKIN: warm and dry

## 2022-12-29 VITALS
TEMPERATURE: 98 F | RESPIRATION RATE: 18 BRPM | HEART RATE: 79 BPM | SYSTOLIC BLOOD PRESSURE: 127 MMHG | DIASTOLIC BLOOD PRESSURE: 63 MMHG

## 2022-12-29 LAB
ALBUMIN SERPL ELPH-MCNC: 3.9 G/DL — SIGNIFICANT CHANGE UP (ref 3.5–5.2)
ALP SERPL-CCNC: 53 U/L — SIGNIFICANT CHANGE UP (ref 30–115)
ALT FLD-CCNC: 21 U/L — SIGNIFICANT CHANGE UP (ref 0–41)
ANION GAP SERPL CALC-SCNC: 12 MMOL/L — SIGNIFICANT CHANGE UP (ref 7–14)
AST SERPL-CCNC: 13 U/L — SIGNIFICANT CHANGE UP (ref 0–41)
BASOPHILS # BLD AUTO: 0.07 K/UL — SIGNIFICANT CHANGE UP (ref 0–0.2)
BASOPHILS NFR BLD AUTO: 0.6 % — SIGNIFICANT CHANGE UP (ref 0–1)
BILIRUB SERPL-MCNC: 0.4 MG/DL — SIGNIFICANT CHANGE UP (ref 0.2–1.2)
BUN SERPL-MCNC: 15 MG/DL — SIGNIFICANT CHANGE UP (ref 10–20)
CALCIUM SERPL-MCNC: 8.9 MG/DL — SIGNIFICANT CHANGE UP (ref 8.4–10.5)
CHLORIDE SERPL-SCNC: 100 MMOL/L — SIGNIFICANT CHANGE UP (ref 98–110)
CO2 SERPL-SCNC: 28 MMOL/L — SIGNIFICANT CHANGE UP (ref 17–32)
CREAT SERPL-MCNC: 0.8 MG/DL — SIGNIFICANT CHANGE UP (ref 0.7–1.5)
EGFR: 117 ML/MIN/1.73M2 — SIGNIFICANT CHANGE UP
EOSINOPHIL # BLD AUTO: 0.14 K/UL — SIGNIFICANT CHANGE UP (ref 0–0.7)
EOSINOPHIL NFR BLD AUTO: 1.3 % — SIGNIFICANT CHANGE UP (ref 0–8)
GLUCOSE SERPL-MCNC: 112 MG/DL — HIGH (ref 70–99)
HCT VFR BLD CALC: 33 % — LOW (ref 42–52)
HGB BLD-MCNC: 11.2 G/DL — LOW (ref 14–18)
IMM GRANULOCYTES NFR BLD AUTO: 0.5 % — HIGH (ref 0.1–0.3)
LYMPHOCYTES # BLD AUTO: 1.96 K/UL — SIGNIFICANT CHANGE UP (ref 1.2–3.4)
LYMPHOCYTES # BLD AUTO: 17.7 % — LOW (ref 20.5–51.1)
MAGNESIUM SERPL-MCNC: 2.1 MG/DL — SIGNIFICANT CHANGE UP (ref 1.8–2.4)
MCHC RBC-ENTMCNC: 31.3 PG — HIGH (ref 27–31)
MCHC RBC-ENTMCNC: 33.9 G/DL — SIGNIFICANT CHANGE UP (ref 32–37)
MCV RBC AUTO: 92.2 FL — SIGNIFICANT CHANGE UP (ref 80–94)
MONOCYTES # BLD AUTO: 1.08 K/UL — HIGH (ref 0.1–0.6)
MONOCYTES NFR BLD AUTO: 9.7 % — HIGH (ref 1.7–9.3)
NEUTROPHILS # BLD AUTO: 7.79 K/UL — HIGH (ref 1.4–6.5)
NEUTROPHILS NFR BLD AUTO: 70.2 % — SIGNIFICANT CHANGE UP (ref 42.2–75.2)
NRBC # BLD: 0 /100 WBCS — SIGNIFICANT CHANGE UP (ref 0–0)
PLATELET # BLD AUTO: 378 K/UL — SIGNIFICANT CHANGE UP (ref 130–400)
POTASSIUM SERPL-MCNC: 3.3 MMOL/L — LOW (ref 3.5–5)
POTASSIUM SERPL-SCNC: 3.3 MMOL/L — LOW (ref 3.5–5)
PROT SERPL-MCNC: 5.8 G/DL — LOW (ref 6–8)
RBC # BLD: 3.58 M/UL — LOW (ref 4.7–6.1)
RBC # FLD: 12.7 % — SIGNIFICANT CHANGE UP (ref 11.5–14.5)
SODIUM SERPL-SCNC: 140 MMOL/L — SIGNIFICANT CHANGE UP (ref 135–146)
SPECIMEN SOURCE: SIGNIFICANT CHANGE UP
T VAGINALIS RRNA SPEC QL NAA+PROBE: SIGNIFICANT CHANGE UP
WBC # BLD: 11.1 K/UL — HIGH (ref 4.8–10.8)
WBC # FLD AUTO: 11.1 K/UL — HIGH (ref 4.8–10.8)

## 2022-12-29 PROCEDURE — 99239 HOSP IP/OBS DSCHRG MGMT >30: CPT

## 2022-12-29 RX ORDER — POTASSIUM CHLORIDE 20 MEQ
40 PACKET (EA) ORAL EVERY 4 HOURS
Refills: 0 | Status: COMPLETED | OUTPATIENT
Start: 2022-12-29 | End: 2022-12-29

## 2022-12-29 RX ORDER — OXYCODONE AND ACETAMINOPHEN 5; 325 MG/1; MG/1
1 TABLET ORAL
Qty: 20 | Refills: 0
Start: 2022-12-29 | End: 2023-01-02

## 2022-12-29 RX ORDER — TRAMADOL HYDROCHLORIDE 50 MG/1
25 TABLET ORAL EVERY 8 HOURS
Refills: 0 | Status: DISCONTINUED | OUTPATIENT
Start: 2022-12-29 | End: 2022-12-29

## 2022-12-29 RX ORDER — CIPROFLOXACIN LACTATE 400MG/40ML
1 VIAL (ML) INTRAVENOUS
Qty: 34 | Refills: 0
Start: 2022-12-29 | End: 2023-01-14

## 2022-12-29 RX ORDER — CIPROFLOXACIN LACTATE 400MG/40ML
500 VIAL (ML) INTRAVENOUS EVERY 12 HOURS
Refills: 0 | Status: DISCONTINUED | OUTPATIENT
Start: 2022-12-29 | End: 2022-12-29

## 2022-12-29 RX ORDER — POTASSIUM CHLORIDE 20 MEQ
20 PACKET (EA) ORAL
Refills: 0 | Status: DISCONTINUED | OUTPATIENT
Start: 2022-12-29 | End: 2022-12-29

## 2022-12-29 RX ADMIN — TRAMADOL HYDROCHLORIDE 25 MILLIGRAM(S): 50 TABLET ORAL at 10:59

## 2022-12-29 RX ADMIN — Medication 40 MILLIEQUIVALENT(S): at 14:38

## 2022-12-29 RX ADMIN — Medication 650 MILLIGRAM(S): at 09:52

## 2022-12-29 RX ADMIN — TRAMADOL HYDROCHLORIDE 25 MILLIGRAM(S): 50 TABLET ORAL at 10:18

## 2022-12-29 RX ADMIN — Medication 500 MILLIGRAM(S): at 17:39

## 2022-12-29 RX ADMIN — Medication 500 MILLIGRAM(S): at 14:39

## 2022-12-29 RX ADMIN — Medication 50 MILLIEQUIVALENT(S): at 11:10

## 2022-12-29 RX ADMIN — Medication 40 MILLIEQUIVALENT(S): at 17:39

## 2022-12-29 NOTE — PROGRESS NOTE ADULT - ASSESSMENT
· Assessment	  37 y Male with PMHx of percocet abuse presented to the ED for testicular pain. Pt was just evaluated in the ed hours before current presentation and found to have epididymitis. Pain started [place] georgette with testicular swelling. Pt given ceftriaxone and told to follow up with urologist. However, pt returned back endorses testicular pain is still 10/10, constant, radiating to the lower belly.     Pt has had increased frequency of urination since 2020 and knew he had some kidney stones but never saw a doctor. Pt is monogamous with his wife for years.    IMPRESSION;   Resolving acute right epididymoorchitis  No abscess  12/25 UCx E coli  12/26 UCx NG  12/26 BCx NG  HIV NG  GC/chlamydia NG  WBC 24.4>12.2    RECOMMENDATIONS;  Po Ciprofloxacin 500 mg q12h till 1/15  NSAIDS  Please do not hesitate to recall ID if any questions arise either through [email or web address] or through [employer]

## 2022-12-29 NOTE — DISCHARGE NOTE PROVIDER - HOSPITAL COURSE
37 y Male with PMHx of percocet abuse presented to the ED for testicular pain. Pt was just evaluated in the ed hours before current presentation and found to have epididymitis. Pain started Turner georgette with testicular swelling. Pt given ceftriaxone and told to follow up with urologist. However, pt returned back endorses testicular pain is still 10/10, constant, radiating to the lower belly. He reports he is monogamous with his significant other for years. Pt Denies burning or new lesions but endorses hematuria. Patient vitals were stable in the ED. WBC 22k. UA positive for LE. US of the testicles showed right testicular and epididymis hyperemia consistent with epididymoorchitis in appropriate clinical setting; No drainable fluid collection. Pt given ceftriaxone and levaquin in the ED and admitted due to unbearable pain.    During hospital stay the following problems were addressed:    #acute right epididymoorchitis  #Sepsis on admission (elevated white count, HR >90)  - CT abd pelvis with IV cont:  No hydronephrosis or nephrolithiasis. Evaluation for intrarenal stones limited by the presence of intravenous contrast. Large liver with mild fatty infiltration. Distended gallbladder with no radiopaque gallstones identified.  - Right testicular and epididymis hyperemia consistent with epididymoorchitis in appropriate clinical setting. No drainable fluid collection.  - HIV neg, grupo/ chlamydia -negative   - 12/25 UCx - E coli pan sensitive  - Uro following: No acute surgical urologic intervention needed, IV abx, urine cx, std panel, urine cytology  - initially on Rocephin 2gm IV q12 for 2 doses (12/27) and then 12/28 rochepin 2gm q24 plus gentamicin 350mg q24 for 2 doses (dose 2 12/28)  - per ID, discharge on PO ciprofloxacin 500mg q12 until 1/15/23  - per ID, d/c with 2-3 days of percocet for pain

## 2022-12-29 NOTE — PROGRESS NOTE ADULT - SUBJECTIVE AND OBJECTIVE BOX
MIRIAMTRACY  37y, Male    All available historical data reviewed    OVERNIGHT EVENTS:  no fevers  pain decreased    ROS:  General: Denies rigors, nightsweats  HEENT: Denies headache, rhinorrhea, sore throat, eye pain  CV: Denies CP, palpitations  PULM: Denies wheezing, hemoptysis  GI: Denies hematemesis, hematochezia, melena  : Denies discharge, hematuria  MSK: Denies arthralgias, myalgias  SKIN: Denies rash, lesions  NEURO: Denies paresthesias, weakness  PSYCH: Denies depression, anxiety    VITALS:  T(F): 98.9, Max: 99.2 (12-28-22 @ 21:00)  HR: 87  BP: 92/47  RR: 18Vital Signs Last 24 Hrs  T(C): 37.2 (29 Dec 2022 05:00), Max: 37.3 (28 Dec 2022 21:00)  T(F): 98.9 (29 Dec 2022 05:00), Max: 99.2 (28 Dec 2022 21:00)  HR: 87 (29 Dec 2022 05:00) (73 - 87)  BP: 92/47 (29 Dec 2022 05:00) (92/47 - 114/77)  BP(mean): --  RR: 18 (29 Dec 2022 05:00) (18 - 18)  SpO2: 99% (28 Dec 2022 21:00) (99% - 99%)    Parameters below as of 28 Dec 2022 21:00  Patient On (Oxygen Delivery Method): room air        TESTS & MEASUREMENTS:                        11.6   12.23 )-----------( 302      ( 28 Dec 2022 08:41 )             33.6     12-29    140  |  100  |  15  ----------------------------<  112<H>  3.3<L>   |  28  |  0.8    Ca    8.9      29 Dec 2022 07:23  Mg     2.1     12-29    TPro  5.8<L>  /  Alb  3.9  /  TBili  0.4  /  DBili  x   /  AST  13  /  ALT  21  /  AlkPhos  53  12-29    LIVER FUNCTIONS - ( 29 Dec 2022 07:23 )  Alb: 3.9 g/dL / Pro: 5.8 g/dL / ALK PHOS: 53 U/L / ALT: 21 U/L / AST: 13 U/L / GGT: x             Culture - Blood (collected 12-26-22 @ 13:28)  Source: .Blood Blood-Peripheral  Preliminary Report (12-27-22 @ 22:02):    No growth to date.    Culture - Urine (collected 12-26-22 @ 11:16)  Source: Catheterized Catheterized  Final Report (12-27-22 @ 20:01):    No growth    Culture - Urine (collected 12-25-22 @ 18:44)  Source: Clean Catch Clean Catch (Midstream)  Final Report (12-28-22 @ 10:17):    >100,000 CFU/ml Escherichia coli  Organism: Escherichia coli (12-28-22 @ 10:17)  Organism: Escherichia coli (12-28-22 @ 10:17)      -  Amikacin: S <=16      -  Amoxicillin/Clavulanic Acid: S <=8/4      -  Ampicillin: S <=8 These ampicillin results predict results for amoxicillin      -  Ampicillin/Sulbactam: S <=4/2 Enterobacter, Klebsiella aerogenes, Citrobacter, and Serratia may develop resistance during prolonged therapy (3-4 days)      -  Aztreonam: S <=4      -  Cefazolin: S <=2 For uncomplicated UTI with K. pneumoniae, E. coli, or P. mirablis: RACHEAL <=16 is sensitive and RACHEAL >=32 is resistant. This also predicts results for oral agents cefaclor, cefdinir, cefpodoxime, cefprozil, cefuroxime axetil, cephalexin and locarbef for uncomplicated UTI. Note that some isolates may be susceptible to these agents while testing resistant to cefazolin.      -  Cefepime: S <=2      -  Cefoxitin: S <=8      -  Ceftriaxone: S <=1 Enterobacter, Klebsiella aerogenes, Citrobacter, and Serratia may develop resistance during prolonged therapy      -  Cefuroxime: S <=4      -  Ciprofloxacin: S <=0.25      -  Ertapenem: S <=0.5      -  Gentamicin: S <=2      -  Imipenem: S <=1      -  Levofloxacin: S <=0.5      -  Meropenem: S <=1      -  Nitrofurantoin: S <=32 Should not be used to treat pyelonephritis      -  Piperacillin/Tazobactam: S <=8      -  Tobramycin: S <=2      -  Trimethoprim/Sulfamethoxazole: S <=0.5/9.5      Method Type: RACHEAL            RADIOLOGY & ADDITIONAL TESTS:  Personal review of radiological diagnostics performed  Echo and EKG results noted when applicable.     MEDICATIONS:  acetaminophen     Tablet .. 650 milliGRAM(s) Oral every 6 hours PRN  cefTRIAXone   IVPB 2000 milliGRAM(s) IV Intermittent every 24 hours  influenza   Vaccine 0.5 milliLiter(s) IntraMuscular once  melatonin 5 milliGRAM(s) Oral at bedtime PRN  ondansetron Injectable 4 milliGRAM(s) IV Push every 8 hours PRN  sodium chloride 0.9%. 1000 milliLiter(s) IV Continuous <Continuous>  zolpidem 5 milliGRAM(s) Oral once PRN      ANTIBIOTICS:  cefTRIAXone   IVPB 2000 milliGRAM(s) IV Intermittent every 24 hours

## 2022-12-29 NOTE — PROGRESS NOTE ADULT - TIME BILLING
Counseled patient about diagnostic testing and treatment plan. All questions answered. Abnormal lab/radiographical/microbiological data reviewed.
Counseled patient about diagnostic testing and treatment plan. All questions answered. Abnormal lab/radiographical/microbiological data reviewed.

## 2022-12-29 NOTE — DISCHARGE NOTE NURSING/CASE MANAGEMENT/SOCIAL WORK - PATIENT PORTAL LINK FT
You can access the FollowMyHealth Patient Portal offered by [facility] by registering at the following website: [email or web address]. By joining [facility] [employer]’s FollowMyHealth portal, you will also be able to view your health information using other applications (apps) compatible with our system.

## 2022-12-29 NOTE — DISCHARGE NOTE PROVIDER - NSDCCPCAREPLAN_GEN_ALL_CORE_FT
PRINCIPAL DISCHARGE DIAGNOSIS  Diagnosis: Epididymo-orchitis, acute  Assessment and Plan of Treatment: You were admitted to the hospital after having testicular pain. During your hospital stay, imaging was done and you were diagnoses with epididymoorchitis. You were seen by the urology and infectious disease teams. You were treated with IV antibiotics and pain meds. Please finish your course of PO antibiotics as directed. Follow up with your primary care doctor outpatient.  Contact your healthcare provider or urologist if:   •You feel that your medicine or treatment is not working.  •You feel more pain, tenderness, or swelling than before.  •You have nausea or a low fever.  •You have questions or concerns about your condition or care.  Return to the emergency department if:   •You have sudden or severe pain in your testicles or abdomen.  •You have pain in both testicles.  •You are vomiting.  •You have a high fever.  •Your pain increases when you elevate your testicles.  •Your scrotum turns blue. This could mean your testicle is not getting the blood flow it needs.       PRINCIPAL DISCHARGE DIAGNOSIS  Diagnosis: Epididymo-orchitis, acute  Assessment and Plan of Treatment: You came to [facility] 12/25 and you are being released on 12/29.  During that time you were found to have epididymoorchitis - infection of the testicules.  You were seen by infectious disease and urology.  You were treated with IV antibiotics and pain medications.  YOu are being discharged with oral antibiotics and pain medication  You were admitted to the hospital after having testicular pain. During your hospital stay, imaging was done and you were diagnoses with epididymoorchitis. You were seen by the urology and infectious disease teams. You were treated with IV antibiotics and pain meds. Please finish your course of PO antibiotics as directed. Follow up with your primary care doctor outpatient.  Contact your healthcare provider or urologist if:   •You feel that your medicine or treatment is not working.  •You feel more pain, tenderness, or swelling than before.  •You have nausea or a low fever.  •You have questions or concerns about your condition or care.  Return to the emergency department if:   •You have sudden or severe pain in your testicles or abdomen.  •You have pain in both testicles.  •You are vomiting.  •You have a high fever.  •Your pain increases when you elevate your testicles.  •Your scrotum turns blue. This could mean your testicle is not getting the blood flow it needs.

## 2022-12-29 NOTE — DISCHARGE NOTE PROVIDER - CARE PROVIDER_API CALL
Claus Longoria (DO)  Internal Medicine  [address] [facility], Admin - Room [address] [place] [address]  Phone: [phone]  Fax: [phone]  Follow Up Time: 2 weeks

## 2022-12-29 NOTE — DISCHARGE NOTE NURSING/CASE MANAGEMENT/SOCIAL WORK - NSDCPEFALRISK_GEN_ALL_CORE
For information on Fall & Injury Prevention, visit: [email or web address] OR  [email or web address] OR  https://www.cdc.gov/steadi/patient.html

## 2022-12-29 NOTE — DISCHARGE NOTE PROVIDER - ATTENDING DISCHARGE PHYSICAL EXAMINATION:
Vital Signs Last 24 Hrs  T(F): 98.9 (29 Dec 2022 05:00), Max: 99.2 (28 Dec 2022 21:00)  HR: 87 (29 Dec 2022 05:00) (73 - 87)  BP: 92/47 (29 Dec 2022 05:00) (92/47 - 114/77)  RR: 18 (29 Dec 2022 05:00) (18 - 18)  SpO2: 99% (28 Dec 2022 21:00) (99% - 99%)    PHYSICAL EXAM:  GENERAL: NAD, well-groomed, well-developed  HEAD:  Atraumatic, Normocephalic  EYES: EOMI, conjunctiva and sclera clear  ENMT: Moist mucous membranes, Good dentition, no thrush  NECK: Supple, No JVD  CHEST/LUNG: Clear to auscultation bilaterally, good air entry, non-labored breathing  HEART: RRR; S1/S2, No murmur  ABDOMEN: Soft, Nontender, Nondistended; Bowel sounds present  VASCULAR: Normal pulses, Normal capillary refill  EXTREMITIES: No calf tenderness, No cyanosis, No edema  LYMPH: Normal; No lymphadenopathy noted  SKIN: Warm, Intact  PSYCH: Normal mood, Normal affect  NERVOUS SYSTEM:  A/O x3, Good concentration; CN 2-12 intact, No focal deficits

## 2022-12-31 LAB
CULTURE RESULTS: SIGNIFICANT CHANGE UP
SPECIMEN SOURCE: SIGNIFICANT CHANGE UP

## 2023-01-04 DIAGNOSIS — K76.0 FATTY (CHANGE OF) LIVER, NOT ELSEWHERE CLASSIFIED: ICD-10-CM

## 2023-01-04 DIAGNOSIS — F17.210 NICOTINE DEPENDENCE, CIGARETTES, UNCOMPLICATED: ICD-10-CM

## 2023-01-04 DIAGNOSIS — B96.20 UNSPECIFIED ESCHERICHIA COLI [E. COLI] AS THE CAUSE OF DISEASES CLASSIFIED ELSEWHERE: ICD-10-CM

## 2023-01-04 DIAGNOSIS — N45.3 EPIDIDYMO-ORCHITIS: ICD-10-CM

## 2023-01-04 DIAGNOSIS — K82.8 OTHER SPECIFIED DISEASES OF GALLBLADDER: ICD-10-CM

## 2023-01-04 DIAGNOSIS — A41.9 SEPSIS, UNSPECIFIED ORGANISM: ICD-10-CM

## 2023-01-04 DIAGNOSIS — N39.0 URINARY TRACT INFECTION, SITE NOT SPECIFIED: ICD-10-CM

## 2024-12-10 NOTE — ED ADULT TRIAGE NOTE - INTERNATIONAL TRAVEL
HPI   Chief Complaint   Patient presents with    Chest Pain       20-year-old transgender male presents for chief complaint of chest pain and dyspnea.  This history of asthma, seizure disorder, PTSD, depression, narcolepsy, and OCD.  Also noted to have a pneumomediastinum in October 2024 which has improved.  Still having difficulty with dyspnea and taking deep breaths.  Presents today for sudden onset midsternal chest pain while sitting taking and study.  Denies injury or heavy lifting.  Occurred arrival in the ED.  No exacerbating or palliating factors.  Denies any recent cough, cold, congestion.  No chills or myalgia.  Does have some nausea.  No hemoptysis.  No recent travel or admissions.  Denies hormone replacement.  No cancer.  Denies change in urination or bowel movement.  Denies rashes or swelling.              Patient History   Past Medical History:   Diagnosis Date    Asthma     Exercise induced bronchospasm ([facility])     Asthma, exercise induced    Personal history of diseases of the blood and blood-forming organs and certain disorders involving the immune mechanism     History of iron deficiency anemia    Personal history of other mental and behavioral disorders     History of depression    Personal history of other mental and behavioral disorders     History of OCD (obsessive compulsive disorder)    Personal history of other specified conditions     History of pseudoseizure     History reviewed. No pertinent surgical history.  Family History   Problem Relation Name Age of Onset    Hypersomnolence Mother      Hypersomnolence Father       Social History     Tobacco Use    Smoking status: Never    Smokeless tobacco: Never   Vaping Use    Vaping status: Never Used   Substance Use Topics    Alcohol use: Yes    Drug use: Yes     Types: Marijuana     Comment: just a little       Physical Exam   ED Triage Vitals [12/09/24 1813]   Temperature Heart Rate Respirations BP   36 °C (96.8 °F) 94 16 136/88      Pulse Ox  Temp Source Heart Rate Source Patient Position   99 % Temporal -- --      BP Location FiO2 (%)     -- --       Physical Exam  Constitutional:       Appearance: Normal appearance.   HENT:      Head: Normocephalic and atraumatic.      Mouth/Throat:      Mouth: Mucous membranes are moist.      Pharynx: Oropharynx is clear.   Eyes:      Extraocular Movements: Extraocular movements intact.      Conjunctiva/sclera: Conjunctivae normal.      Pupils: Pupils are equal, round, and reactive to light.   Cardiovascular:      Rate and Rhythm: Normal rate and regular rhythm.      Pulses: Normal pulses.      Heart sounds: Normal heart sounds.   Pulmonary:      Effort: Pulmonary effort is normal.      Breath sounds: Normal breath sounds. Decreased air movement present.      Comments: Unable to fully take deep breath.  Lung sounds clear and equal bilaterally though.  Abdominal:      General: Abdomen is flat.      Palpations: Abdomen is soft.   Musculoskeletal:         General: Normal range of motion.      Cervical back: Normal range of motion and neck supple.   Skin:     General: Skin is warm and dry.      Capillary Refill: Capillary refill takes less than 2 seconds.   Neurological:      General: No focal deficit present.      Mental Status: He is alert and oriented to person, place, and time.   Psychiatric:         Mood and Affect: Mood normal.         Behavior: Behavior normal.         Thought Content: Thought content normal.         Judgment: Judgment normal.           ED Course & MDM   Diagnoses as of 12/10/24 0325   Dyspnea, unspecified type   Chest pain, unspecified type                 No data recorded                                 Medical Decision Making  Vital signs reviewed, unremarkable at this time.  Patient overall is well-appearing and in no apparent distress.  Speaks full sentences without difficulty.  Diagnostic testing performed.  Medication given for symptom management, including Pepcid, Tylenol, oral hydration,  Maalox, and viscous lidocaine.  Twelve-lead EKG shows sinus tachycardia with no evidence of ischemia with normal axis.  Rate 114.  No T wave inversions.  LA interval 128.  QRS duration 88.  QTc 452.  On reevaluation after the medications, patient has no real improvement.  With this, plus a tachycardic EKG and significant history with pneumomediastinum, we did elect to do labs and CT PE. Labs grossly unremarkable.  CBC with differential, CMP, troponin, magnesium, urinalysis all within normal ranges and unremarkable.  Specifically troponin less than 3 and unremarkable.  Pregnancy negative.  Chest x-ray showed no acute cardiopulmonary process.  CT angio chest for PE showed no significant PE or other acute process identified in the chest.  On reevaluation the patient endorses persistent pain.  Still in the midsternal chest.  We discussed other possibilities of cause of pain.  With the diagnostic testing that we did, we were able to rule out concern for pneumomediastinum, pericardial effusion, lung effusion, pneumonia, dissection.  Low suspicion at this point for ACS with negative troponin, reassuring EKG as well. We did repeat EKG and perform delta troponin.  Heart score at this point 2. We did discuss possible causes if not the above.  Possible causes include but are not limited to GERD, gastritis, costochondritis/musculoskeletal pain, anxiety.  Patient does study a case and does have finals coming up.  The pain onset with studying.  Patient does not feel the same and states that this does not feel like anxiety, musculoskeletal.  Asthma is another possibility, as PFTs previously showed concern for asthma predominantly.  Walking pulse ox was performed which showed initial pulse ox dropped to 76%.  We reevaluated and realized that the pulse ox probe was not well-connected to the finger, as the patient was ambulating with crutches.  Patient also had very thick mask on.  We were able to fix this on the next go around.   Next time around the pulse ox showed 98% on ambulation.  We did another walking pulse ox after that to confirm with similar results.  By the end of my shift the patient was still endorsing dyspnea.  Next plan to give DuoNebs.  3 DuoNebs, spaced 10 minutes apart.  Of note also patient does have previous echocardiogram from 2023 with normal left ventricular systolic function. Patient remained calm and cooperative in a stable condition.  Handed off to oncoming provider.        Procedure  Procedures     Chapincito Griffith, GEORGE-CNP  12/10/24 0321     No